# Patient Record
Sex: FEMALE | Race: WHITE | Employment: FULL TIME | URBAN - METROPOLITAN AREA
[De-identification: names, ages, dates, MRNs, and addresses within clinical notes are randomized per-mention and may not be internally consistent; named-entity substitution may affect disease eponyms.]

---

## 2018-02-20 PROBLEM — E03.9 HYPOTHYROIDISM: Status: ACTIVE | Noted: 2018-02-20

## 2019-05-28 ENCOUNTER — OFFICE VISIT (OUTPATIENT)
Dept: PRIMARY CARE CLINIC | Age: 69
End: 2019-05-28
Payer: COMMERCIAL

## 2019-05-28 VITALS
WEIGHT: 153.8 LBS | SYSTOLIC BLOOD PRESSURE: 122 MMHG | DIASTOLIC BLOOD PRESSURE: 84 MMHG | HEART RATE: 69 BPM | BODY MASS INDEX: 27.46 KG/M2 | OXYGEN SATURATION: 98 %

## 2019-05-28 DIAGNOSIS — Z12.31 ENCOUNTER FOR SCREENING MAMMOGRAM FOR BREAST CANCER: ICD-10-CM

## 2019-05-28 DIAGNOSIS — Z78.0 POST-MENOPAUSAL: ICD-10-CM

## 2019-05-28 DIAGNOSIS — Z11.59 NEED FOR HEPATITIS C SCREENING TEST: ICD-10-CM

## 2019-05-28 DIAGNOSIS — R06.2 WHEEZING: ICD-10-CM

## 2019-05-28 DIAGNOSIS — Z12.11 SCREENING FOR COLON CANCER: ICD-10-CM

## 2019-05-28 DIAGNOSIS — E03.9 HYPOTHYROIDISM, UNSPECIFIED TYPE: Primary | ICD-10-CM

## 2019-05-28 PROCEDURE — 1123F ACP DISCUSS/DSCN MKR DOCD: CPT | Performed by: FAMILY MEDICINE

## 2019-05-28 PROCEDURE — G8427 DOCREV CUR MEDS BY ELIG CLIN: HCPCS | Performed by: FAMILY MEDICINE

## 2019-05-28 PROCEDURE — 1036F TOBACCO NON-USER: CPT | Performed by: FAMILY MEDICINE

## 2019-05-28 PROCEDURE — 4040F PNEUMOC VAC/ADMIN/RCVD: CPT | Performed by: FAMILY MEDICINE

## 2019-05-28 PROCEDURE — 99213 OFFICE O/P EST LOW 20 MIN: CPT | Performed by: FAMILY MEDICINE

## 2019-05-28 PROCEDURE — G8400 PT W/DXA NO RESULTS DOC: HCPCS | Performed by: FAMILY MEDICINE

## 2019-05-28 PROCEDURE — G8419 CALC BMI OUT NRM PARAM NOF/U: HCPCS | Performed by: FAMILY MEDICINE

## 2019-05-28 PROCEDURE — 3017F COLORECTAL CA SCREEN DOC REV: CPT | Performed by: FAMILY MEDICINE

## 2019-05-28 PROCEDURE — 1090F PRES/ABSN URINE INCON ASSESS: CPT | Performed by: FAMILY MEDICINE

## 2019-05-28 RX ORDER — LEVOTHYROXINE SODIUM 0.07 MG/1
TABLET ORAL
Qty: 90 TABLET | Refills: 3 | Status: SHIPPED | OUTPATIENT
Start: 2019-05-28 | End: 2020-06-02 | Stop reason: SDUPTHER

## 2019-05-28 RX ORDER — ALBUTEROL SULFATE 90 UG/1
2 AEROSOL, METERED RESPIRATORY (INHALATION) EVERY 4 HOURS PRN
Qty: 1 INHALER | Refills: 1 | Status: SHIPPED | OUTPATIENT
Start: 2019-05-28 | End: 2022-07-29

## 2019-05-28 ASSESSMENT — ENCOUNTER SYMPTOMS
EYE ITCHING: 0
SHORTNESS OF BREATH: 0
COUGH: 1
WHEEZING: 1

## 2019-05-28 ASSESSMENT — PATIENT HEALTH QUESTIONNAIRE - PHQ9
1. LITTLE INTEREST OR PLEASURE IN DOING THINGS: 0
2. FEELING DOWN, DEPRESSED OR HOPELESS: 0
SUM OF ALL RESPONSES TO PHQ QUESTIONS 1-9: 0
SUM OF ALL RESPONSES TO PHQ9 QUESTIONS 1 & 2: 0
SUM OF ALL RESPONSES TO PHQ QUESTIONS 1-9: 0

## 2019-05-28 NOTE — PATIENT INSTRUCTIONS
Patient Education        Wheezing or Bronchoconstriction: Care Instructions  Your Care Instructions  Wheezing is a whistling noise made during breathing. It occurs when the small airways, or bronchial tubes, that lead to your lungs swell or contract (spasm) and become narrow. This narrowing is called bronchoconstriction. When your airways constrict, it is hard for air to pass through and this makes it hard for you to breathe. Wheezing and bronchoconstriction can be caused by many problems, including:  · An infection such as the flu or a cold. · Allergies such as hay fever. · Diseases such as asthma or chronic obstructive pulmonary disease. · Smoking. Treatment for your wheezing depends on what is causing the problem. Your wheezing may get better without treatment. But you may need to pay attention to things that cause your wheezing and avoid them. Or you may need medicine to help treat the wheezing and to reduce the swelling or to relieve spasms in your lungs. Follow-up care is a key part of your treatment and safety. Be sure to make and go to all appointments, and call your doctor if you are having problems. It is also a good idea to know your test results and keep a list of the medicines you take. How can you care for yourself at home? · Take your medicine exactly as prescribed. Call your doctor if you think you are having a problem with your medicine. You will get more details on the specific medicine your doctor prescribes. · If your doctor prescribed antibiotics, take them as directed. Do not stop taking them just because you feel better. You need to take the full course of antibiotics. · Breathe moist air from a humidifier, hot shower, or sink filled with hot water. This may help ease your symptoms and make it easier for you to breathe. · If you have congestion in your nose and throat, drinking plenty of fluids, especially hot fluids, may help relieve your symptoms.  If you have kidney, heart, or liver disease and have to limit fluids, talk with your doctor before you increase the amount of fluids you drink. · If you have mucus in your airways, it may help to breathe deeply and cough. · Do not smoke or allow others to smoke around you. Smoking can make your wheezing worse. If you need help quitting, talk to your doctor about stop-smoking programs and medicines. These can increase your chances of quitting for good. · Avoid things that may cause your wheezing. These may include colds, smoke, air pollution, dust, pollen, pets, cockroaches, stress, and cold air. When should you call for help? Call 911 anytime you think you may need emergency care. For example, call if:    · You have severe trouble breathing.     · You passed out (lost consciousness).    Call your doctor now or seek immediate medical care if:    · You cough up yellow, dark brown, or bloody mucus (sputum).     · You have new or worse shortness of breath.     · Your wheezing is not getting better or it gets worse after you start taking your medicine.    Watch closely for changes in your health, and be sure to contact your doctor if:    · You do not get better as expected. Where can you learn more? Go to https://Only Mall[employer]ARE [employer]KS.[facility]. org and sign in to your [employer] account. Enter [id number] [id number] in the [facility] box to learn more about \"Wheezing or Bronchoconstriction: Care Instructions. \"     If you do not have an account, please click on the \"Sign Up Now\" link. Current as of: September 5, 2018  Content Version: 12.0  © 2941-1547 Healthwise, Incorporated. Care instructions adapted under license by [facility] ([facility]). If you have questions about a medical condition or this instruction, always ask your healthcare professional. [place] [address] any warranty or liability for your use of this information.          Patient Education        Colon Cancer Screening: Care Instructions  Your Care Instructions    Colorectal cancer a tissue sample (biopsy). What tests do you need? The following guidelines are for adults who are not at high risk for colorectal cancer. You may have at least one of these tests as directed by your doctor. · Fecal immunochemical test (FIT) or guaiac fecal occult blood test (gFOBT) every year  · Sigmoidoscopy every 5 years  · Colonoscopy every 10 years  If you are over age 76, you can work with your doctor to decide if screening is a good option. Talk with your doctor about when you need to be tested. And discuss which tests are right for you. Your doctor may recommend earlier or more frequent testing if you:  · Have had colorectal cancer before. · Have had colon polyps. · Have symptoms of colorectal cancer. These include blood in your stool and changes in your bowel habits. · Have a parent, brother or sister, or child with colon polyps or colorectal cancer. · Have a bowel disease. This includes ulcerative colitis and Crohn's disease. · Have a rare polyp syndrome that runs in families, such as familial adenomatous polyposis (FAP). · Have had radiation treatments to the belly or pelvis. When should you call for help? Watch closely for changes in your health, and be sure to contact your doctor if:    · You have any changes in your bowel habits.     · You have any problems. Where can you learn more? Go to https://[employer]. org and sign in to your "[employer] account. Enter [id number] [id number] [phone] in the [facility] box to learn more about \"Colon Cancer Screening: Care Instructions. \"     If you do not have an account, please click on the \"Sign Up Now\" link. Current as of: December 19, 2018  Content Version: 12.0  © 2896-3718 Healthwise, Incorporated. Care instructions adapted under license by [facility] ([facility]).  If you have questions about a medical condition or this instruction, always ask your healthcare professional. [address] any warranty or liability for your use of this information. Patient Education        tetanus, diphtheria, acellular pertussis vaccine (Tdap)  Pronunciation:  TET a nus, dif THEER ee a, and ay POLI gordono lyn per TUS iss  Brand:  Adacel (Tdap), Boostrix (Tdap)  What is the most important information I should know about this vaccine? You should not receive this vaccine if you have ever had had a life-threatening allergic reaction to a tetanus, diphtheria, or pertussis vaccine. You also should not receive this vaccine if you had a neurologic disorder affecting your brain within 7 days after having a previous pertussis vaccine. What is tetanus, diphtheria, acellular pertussis vaccine (Tdap)? Tetanus, diphtheria, and pertussis are serious diseases caused by bacteria. Tetanus (lockjaw) causes painful tightening of the muscles, usually all over the body. It can lead to \"locking\" of the jaw so the victim cannot open the mouth or swallow. Tetanus leads to death in about 1 out of 10 cases. Diphtheria causes a thick coating in the nose, throat, and airways. It can lead to breathing problems, paralysis, heart failure, or death. Pertussis (whooping cough) causes coughing so severe that it interferes with eating, drinking, or breathing. These spells can last for weeks and can lead to pneumonia, seizures (convulsions), brain damage, and death. Diphtheria and pertussis are spread from person to person. Tetanus enters the body through a cut or wound. The diphtheria, tetanus acellular, and pertussis adult vaccine (also called Tdap) is used to help prevent these diseases in people who are at least 8years old. Most people in this age group require only one Tdap shot for protection against these diseases. Tdap vaccine is especially important for healthcare workers or people who have close contact with a baby younger than 13 months old.   This vaccine works by exposing you to a small dose of the bacteria or a protein from the bacteria, which causes the body to develop immunity to the disease. This vaccine will not treat an active infection that has already developed in the body. Like any vaccine, the Tdap vaccine may not provide protection from disease in every person. What should I discuss with my healthcare provider before receiving this vaccine? You should not receive this vaccine if:  · you had a life-threatening allergic reaction to any vaccine that contains tetanus, diphtheria, or pertussis; or  · you had a neurologic disorder affecting your brain (such as loss of consciousness or a prolonged seizure) within 7 days after having a previous pertussis vaccine. You may not be able to receive a Tdap vaccine if you have ever received a similar vaccine that caused any of the following:  · a very high fever (over 104 degrees Fahrenheit);  · a neurologic disorder or disease affecting the brain;  · fainting or going into shock;  · severe pain, redness, tenderness, swelling, or a lump where the shot was given;  · an allergy to latex rubber;  · severe or uncontrolled epilepsy or other seizure disorder; or  · Guillain-Barré syndrome (within 6 weeks after receiving a vaccine containing tetanus). If you have any of these other conditions, your vaccine may need to be postponed or not given at all:  · a history of seizures;  · a weak immune system caused by disease, bone marrow transplant, or by using certain medicines or receiving cancer treatments; or  · if it has been less than 10 years since you last received a tetanus shot. You can still receive a vaccine if you have a minor cold. In the case of a more severe illness with a fever or any type of infection, wait until you get better before receiving this vaccine. It is not known whether Tdap vaccine will harm an unborn baby. However, you may need a Tdap vaccine during pregnancy to protect your  baby from pertussis. [place] babies are most at risk for severe, life-threatening complications from pertussis.  Your doctor should determine whether you need this vaccine during pregnancy. If you are pregnant, your name may be listed on a pregnancy registry. This is to track the outcome of the pregnancy and to evaluate any effects of the Tdap vaccine on the baby. It is not known whether Tdap vaccine passes into breast milk or if it could harm a nursing baby. Tell your doctor if you are breast-feeding a baby. The adult version of this vaccine (Adacel, Boostrix) should not be given to anyone under the age of 8. Another vaccine is available for use in children younger than 8years old. How is this vaccine given? This vaccine is given as an injection (shot) into a muscle. You will receive this injection in a doctor's office or clinic setting. Tdap vaccine is usually given as a one-time injection. Unless your doctor's tells you otherwise, you will not need a booster vaccine. Tdap vaccine is usually given once every 10 years. What happens if I miss a dose? Since the Tdap vaccine is usually given only once, you are not likely to miss a dose. What happens if I overdose? An overdose of this vaccine is unlikely to occur. What should I avoid before or after receiving this vaccine? Follow your doctor's instructions about any restrictions on food, beverages, or activity after receiving a Tdap vaccine. What are the possible side effects of this vaccine? Keep track of any and all side effects you have after receiving this vaccine. If you ever need to receive a booster dose, you will need to tell your doctor if the previous shot caused any side effects. You should not receive a booster vaccine if you had a life threatening allergic reaction after the first shot. Becoming infected with diphtheria, pertussis, or tetanus is much more dangerous to your health than receiving this vaccine. However, like any medicine, this vaccine can cause side effects but the risk of serious side effects is extremely low.   Get emergency medical help if you have signs of an allergic reaction: hives; difficult breathing; swelling of your face, lips, tongue, or throat. Call your doctor at once if you have any of these side effects within 7 days after receiving Tdap vaccine:  · numbness, weakness, or tingling in your feet and legs;  · problems with walking or coordination;  · sudden pain in your arms or shoulders;  · a light-headed feeling, like you might pass out;  · vision problems, ringing in your ears;  · seizure (black-out or convulsions); or  · redness, swelling, bleeding, or severe pain where the shot was given. Common side effects may include:  · mild pain or tenderness where the shot was given;  · headache or tiredness;  · body aches; or  · mild nausea, diarrhea, or vomiting. This is not a complete list of side effects and others may occur. Call your doctor for medical advice about side effects. You may report vaccine side effects to the [place] [address] and Human Services at [phone]. What other drugs will affect tetanus, diphtheria, acellular pertussis vaccine? Before receiving this vaccine, tell your doctor about all other vaccines you have recently received. Also tell the doctor if you have recently received drugs or treatments that can weaken the immune system, including:  · an oral, nasal, inhaled, or injectable steroid medicine;  · medications to treat psoriasis, rheumatoid arthritis, or other autoimmune disorders; or  · medicines to treat or prevent organ transplant rejection. If you are using any of these medications, you may not be able to receive the vaccine, or may need to wait until the other treatments are finished. This list is not complete. Other drugs may interact with this vaccine, including prescription and over-the-counter medicines, vitamins, and herbal products. Not all possible interactions are listed in this medication guide. Where can I get more information?   Your doctor or pharmacist can provide more information about this vaccine. Additional information is available from your local health department or the Centers for Disease Control and Prevention. Remember, keep this and all other medicines out of the reach of children, never share your medicines with others, and use this medication only for the indication prescribed. Every effort has been made to ensure that the information provided by [address] is accurate, up-to-date, and complete, but no guarantee is made to that effect. Drug information contained herein may be time sensitive. [facility] information has been compiled for use by healthcare practitioners and consumers in the [place] and therefore [facility] does not warrant that uses outside of the [place] are appropriate, unless specifically indicated otherwise. [facility]'s drug information does not endorse drugs, diagnose patients or recommend therapy. [facility]'s drug information is an informational resource designed to assist licensed healthcare practitioners in caring for their patients and/or to serve consumers viewing this service as a supplement to, and not a substitute for, the expertise, skill, knowledge and judgment of healthcare practitioners. The absence of a warning for a given drug or drug combination in no way should be construed to indicate that the drug or drug combination is safe, effective or appropriate for any given patient. [facility] does not assume any responsibility for any aspect of healthcare administered with the aid of information [facility] provides. The information contained herein is not intended to cover all possible uses, directions, precautions, warnings, drug interactions, allergic reactions, or adverse effects. If you have questions about the drugs you are taking, check with your doctor, nurse or pharmacist.  Copyright 8019-4851 [address]. Version: 3.01. Revision date: 8/19/2016. Care instructions adapted under license by [facility] ([facility]).  If you have questions about a medical condition or this instruction, always ask your healthcare professional. [place] [address] any warranty or liability for your use of this information.

## 2019-05-28 NOTE — PROGRESS NOTES
Corwin Yeboah a 77 y.o. female who presents today for her medical conditions/complaints as notedbelow. Chief Complaint   Patient presents with    Medication Check    Wheezing         HPI:   HPI  Thyroid med: has not had labs recently. Energy level ok  No hair or nail issues. Wheezing off and on x 12 + months. Worse since the spring, was better in the winter  Last fall : she's not sure. Not using any meds for now. Used up her inhlaer. Inhaler did help. LDL Calculated (mg/dL)   Date Value   02/15/2018 148       (goal LDL is <100)   No results found for: AST, ALT, BUN  BP Readings from Last 3 Encounters:   05/28/19 122/84   02/20/18 126/82   02/13/18 130/86          (goal 120/80)    Past Medical History:   Diagnosis Date    Thyroid disease       Past Surgical History:   Procedure Laterality Date    DILATION AND CURETTAGE OF UTERUS  1980's    TONSILLECTOMY      TUBAL LIGATION         Family History   Problem Relation Age of Onset    Alzheimer's Disease Father     Alzheimer's Disease Maternal Grandmother     Heart Disease Paternal Grandfather        Social History     Tobacco Use    Smoking status: Never Smoker    Smokeless tobacco: Never Used   Substance Use Topics    Alcohol use: Yes     Comment: rarely      Current Outpatient Medications   Medication Sig Dispense Refill    albuterol sulfate HFA (PROAIR HFA) 108 (90 Base) MCG/ACT inhaler Inhale 2 puffs into the lungs every 4 hours as needed for Wheezing 1 Inhaler 1    levothyroxine (SYNTHROID) 75 MCG tablet One tab daily Mon- Saturday and 1/2 tab on Sunday 90 tablet 3    Multiple Vitamins-Minerals (THERAPEUTIC MULTIVITAMIN-MINERALS) tablet Take 1 tablet by mouth daily      loratadine (CLARITIN) 10 MG tablet Take 10 mg by mouth as needed       No current facility-administered medications for this visit.       Allergies   Allergen Reactions    Sulfa Antibiotics Rash       Health Maintenance   Topic Date Due    Hepatitis C screen 1950    DTaP/Tdap/Td vaccine (1 - Tdap) 03/10/1970    Breast cancer screen  03/10/2001    Shingles Vaccine (1 of 2) 03/10/2001    Colon cancer screen colonoscopy  03/10/2001    DEXA (modify frequency per FRAX score)  03/10/2016    Pneumococcal 65+ years Vaccine (1 of 2 - PCV13) 03/10/2016    TSH testing  02/15/2019    Flu vaccine (Season Ended) 09/01/2019    Lipid screen  02/15/2023       Subjective:      Review of Systems   Constitutional: Negative. HENT: Negative. Eyes: Negative for itching. Respiratory: Positive for cough (rarely) and wheezing. Negative for shortness of breath. Objective:     /84   Pulse 69   Wt 153 lb 12.8 oz (69.8 kg)   SpO2 98%   BMI 27.46 kg/m²   Physical Exam   Constitutional: She is oriented to person, place, and time. She appears well-developed and well-nourished. No distress. HENT:   Head: Normocephalic and atraumatic. Right Ear: External ear normal.   Left Ear: External ear normal.   Mouth/Throat: Oropharynx is clear and moist.   Eyes: Pupils are equal, round, and reactive to light. Conjunctivae are normal. Right eye exhibits no discharge. Left eye exhibits no discharge. No scleral icterus. Neck: No tracheal deviation present. No thyromegaly present. Cardiovascular: Normal rate, regular rhythm and normal heart sounds. No carotid bruits   Pulmonary/Chest: Effort normal and breath sounds normal. No respiratory distress. She has no wheezes. Musculoskeletal: She exhibits no edema. Lymphadenopathy:     She has no cervical adenopathy. Neurological: She is alert and oriented to person, place, and time. Skin: Skin is warm. No rash noted. Psychiatric: She has a normal mood and affect. Her behavior is normal. Thought content normal.   Nursing note and vitals reviewed. Assessment:       Diagnosis Orders   1. Hypothyroidism, unspecified type  TSH 3RD GENERATION    T4, Free    levothyroxine (SYNTHROID) 75 MCG tablet   2.  Wheezing  Full PFT Study With Bronchodilator    XR CHEST STANDARD (2 VW)   3. Post-menopausal  DEXA Bone Density Axial Skeleton   4. Encounter for screening mammogram for breast cancer  MEET Digital Screen Bilateral [[id number]]   5. Screening for colon cancer  POCT FECAL IMMUNOCHEMICAL TEST (FIT)   6. Need for hepatitis C screening test  Hepatitis C Antibody        Plan:      No follow-ups on file. Orders Placed This Encounter   Procedures    XR CHEST STANDARD (2 VW)     Standing Status:   Future     Standing Expiration Date:   5/27/2020     Order Specific Question:   Reason for exam:     Answer:   recurrent wheezing    MEET Digital Screen Bilateral [[id number]]     Standing Status:   Future     Standing Expiration Date:   5/27/2020    DEXA Bone Density Axial Skeleton     Standing Status:   Future     Standing Expiration Date:   5/28/2020    TSH 3RD GENERATION     Standing Status:   Future     Standing Expiration Date:   5/27/2020    T4, Free     Standing Status:   Future     Standing Expiration Date:   5/27/2020    Hepatitis C Antibody     Standing Status:   Future     Standing Expiration Date:   5/28/2020    POCT FECAL IMMUNOCHEMICAL TEST (FIT)     Standing Status:   Future     Standing Expiration Date:   5/28/2020    Full PFT Study With Bronchodilator     Standing Status:   Future     Standing Expiration Date:   5/27/2020     Scheduling Instructions: This is to be done at the hospital     Orders Placed This Encounter   Medications    albuterol sulfate HFA (PROAIR HFA) 108 (90 Base) MCG/ACT inhaler     Sig: Inhale 2 puffs into the lungs every 4 hours as needed for Wheezing     Dispense:  1 Inhaler     Refill:  1    levothyroxine (SYNTHROID) 75 MCG tablet     Sig: One tab daily Mon- Saturday and 1/2 tab on Sunday     Dispense:  90 tablet     Refill:  3       Patient given educational materials - see patient instructions. Discussed use, benefit, and side effects of prescribed medications.   All patient questions

## 2019-12-11 ENCOUNTER — TELEPHONE (OUTPATIENT)
Dept: PRIMARY CARE CLINIC | Age: 69
End: 2019-12-11

## 2019-12-11 ENCOUNTER — TELEPHONE (OUTPATIENT)
Dept: WOMENS IMAGING | Age: 69
End: 2019-12-11

## 2020-06-02 ENCOUNTER — OFFICE VISIT (OUTPATIENT)
Dept: PRIMARY CARE CLINIC | Age: 70
End: 2020-06-02
Payer: COMMERCIAL

## 2020-06-02 VITALS
SYSTOLIC BLOOD PRESSURE: 118 MMHG | WEIGHT: 151.8 LBS | DIASTOLIC BLOOD PRESSURE: 72 MMHG | OXYGEN SATURATION: 96 % | HEART RATE: 83 BPM | BODY MASS INDEX: 26.9 KG/M2 | TEMPERATURE: 98.1 F | RESPIRATION RATE: 16 BRPM | HEIGHT: 63 IN

## 2020-06-02 PROCEDURE — 1090F PRES/ABSN URINE INCON ASSESS: CPT | Performed by: FAMILY MEDICINE

## 2020-06-02 PROCEDURE — 1036F TOBACCO NON-USER: CPT | Performed by: FAMILY MEDICINE

## 2020-06-02 PROCEDURE — G8427 DOCREV CUR MEDS BY ELIG CLIN: HCPCS | Performed by: FAMILY MEDICINE

## 2020-06-02 PROCEDURE — 4040F PNEUMOC VAC/ADMIN/RCVD: CPT | Performed by: FAMILY MEDICINE

## 2020-06-02 PROCEDURE — 1123F ACP DISCUSS/DSCN MKR DOCD: CPT | Performed by: FAMILY MEDICINE

## 2020-06-02 PROCEDURE — 3017F COLORECTAL CA SCREEN DOC REV: CPT | Performed by: FAMILY MEDICINE

## 2020-06-02 PROCEDURE — G8400 PT W/DXA NO RESULTS DOC: HCPCS | Performed by: FAMILY MEDICINE

## 2020-06-02 PROCEDURE — G8419 CALC BMI OUT NRM PARAM NOF/U: HCPCS | Performed by: FAMILY MEDICINE

## 2020-06-02 PROCEDURE — 99213 OFFICE O/P EST LOW 20 MIN: CPT | Performed by: FAMILY MEDICINE

## 2020-06-02 RX ORDER — LEVOTHYROXINE SODIUM 0.07 MG/1
TABLET ORAL
Qty: 90 TABLET | Refills: 3 | Status: SHIPPED | OUTPATIENT
Start: 2020-06-02 | End: 2021-07-09 | Stop reason: SDUPTHER

## 2020-06-02 RX ORDER — LEVOCETIRIZINE DIHYDROCHLORIDE 5 MG/1
5 TABLET, FILM COATED ORAL NIGHTLY
COMMUNITY

## 2020-06-02 ASSESSMENT — PATIENT HEALTH QUESTIONNAIRE - PHQ9
SUM OF ALL RESPONSES TO PHQ QUESTIONS 1-9: 0
SUM OF ALL RESPONSES TO PHQ9 QUESTIONS 1 & 2: 0
1. LITTLE INTEREST OR PLEASURE IN DOING THINGS: 0
2. FEELING DOWN, DEPRESSED OR HOPELESS: 0
SUM OF ALL RESPONSES TO PHQ QUESTIONS 1-9: 0

## 2020-06-02 ASSESSMENT — ENCOUNTER SYMPTOMS: SHORTNESS OF BREATH: 1

## 2020-06-02 NOTE — PATIENT INSTRUCTIONS
reaction, other serious injury, or death. What if there is a serious problem? An allergic reaction could occur after the vaccinated person leaves the clinic. If you see signs of a severe allergic reaction (hives, swelling of the face and throat, difficulty breathing, a fast heartbeat, dizziness, or weakness), call 9-1-1 and get the person to the nearest hospital.  For other signs that concern you, call your health care provider. Adverse reactions should be reported to the Vaccine Adverse Event Reporting System (VAERS). Your health care provider will usually file this report, or you can do it yourself. Visit the VAERS website at www.vaers. hhs.gov at www.vaers. hhs.gov or call 2-823.812.3132. VAERS is only for reporting reactions, and VAERS staff do not give medical advice. How can I learn more? · Ask your health care provider. · Call your local or state health department. · Contact the Centers for Disease Control and Prevention (CDC):  ? Call 0-854.343.8500 (1-800-CDC-INFO) or  ? Visit CDC's website at www.cdc.gov/vaccines  Vaccine Information Statement  PPSV23 Vaccine  10/30/2019  [facility] and [facility] for Disease Control and Prevention  Many Vaccine Information Statements are available in Chinese and other languages. See www.immunize.org/vis. Hojas de información Sobre Vacunas están disponibles en español y en muchos otros idiomas. Visite Paul.si. Care instructions adapted under license by [facility] ([facility]). If you have questions about a medical condition or this instruction, always ask your healthcare professional. [place] [address] any warranty or liability for your use of this information.          Patient Education        tetanus, diphtheria, acellular pertussis vaccine (Tdap)  Pronunciation:  TET a nus, dif THEER ee a, and ay POLI elizabeth ler per TUS iss  Brand:  Adacel (Tdap), Boostrix (Tdap)  What is the most important information I should know receiving this vaccine? You should not receive this vaccine if:  · you had a life-threatening allergic reaction to any vaccine that contains tetanus, diphtheria, or pertussis; or  · you had a neurologic disorder affecting your brain (such as loss of consciousness or a prolonged seizure) within 7 days after having a previous pertussis vaccine. You may not be able to receive a Tdap vaccine if you have ever received a similar vaccine that caused any of the following:  · a very high fever (over 104 degrees Fahrenheit);  · a neurologic disorder or disease affecting the brain;  · fainting or going into shock;  · severe pain, redness, tenderness, swelling, or a lump where the shot was given;  · an allergy to latex rubber;  · severe or uncontrolled epilepsy or other seizure disorder; or  · Guillain-Barré syndrome (within 6 weeks after receiving a vaccine containing tetanus). If you have any of these other conditions, your vaccine may need to be postponed or not given at all:  · a history of seizures;  · a weak immune system caused by disease, bone marrow transplant, or by using certain medicines or receiving cancer treatments; or  · if it has been less than 10 years since you last received a tetanus shot. You can still receive a vaccine if you have a minor cold. In the case of a more severe illness with a fever or any type of infection, wait until you get better before receiving this vaccine. It is not known whether Tdap vaccine will harm an unborn baby. However, you may need a Tdap vaccine during pregnancy to protect your  baby from pertussis. Yahaira Edwards babies are most at risk for severe, life-threatening complications from pertussis. Your doctor should determine whether you need this vaccine during pregnancy. If you are pregnant, your name may be listed on a pregnancy registry. This is to track the outcome of the pregnancy and to evaluate any effects of the Tdap vaccine on the baby.   It is not known whether Tdap vaccine passes into breast milk or if it could harm a nursing baby. Tell your doctor if you are breast-feeding a baby. The adult version of this vaccine (Adacel, Boostrix) should not be given to anyone under the age of 8. Another vaccine is available for use in children younger than 8years old. How is this vaccine given? This vaccine is given as an injection (shot) into a muscle. You will receive this injection in a doctor's office or clinic setting. Tdap vaccine is usually given as a one-time injection. Unless your doctor's tells you otherwise, you will not need a booster vaccine. Tdap vaccine is usually given once every 10 years. What happens if I miss a dose? Since the Tdap vaccine is usually given only once, you are not likely to miss a dose. What happens if I overdose? An overdose of this vaccine is unlikely to occur. What should I avoid before or after receiving this vaccine? Follow your doctor's instructions about any restrictions on food, beverages, or activity after receiving a Tdap vaccine. What are the possible side effects of this vaccine? Keep track of any and all side effects you have after receiving this vaccine. If you ever need to receive a booster dose, you will need to tell your doctor if the previous shot caused any side effects. You should not receive a booster vaccine if you had a life threatening allergic reaction after the first shot. Becoming infected with diphtheria, pertussis, or tetanus is much more dangerous to your health than receiving this vaccine. However, like any medicine, this vaccine can cause side effects but the risk of serious side effects is extremely low. Get emergency medical help if you have signs of an allergic reaction: hives; difficult breathing; swelling of your face, lips, tongue, or throat.   Call your doctor at once if you have any of these side effects within 7 days after receiving Tdap vaccine:  · numbness, weakness, or tingling in your feet and Know  Why get vaccinated? Recombinant zoster (shingles) vaccine can prevent shingles. Shingles (also called herpes zoster, or just zoster) is a painful skin rash, usually with blisters. In addition to the rash, shingles can cause fever, headache, chills, or upset stomach. More rarely, shingles can lead to pneumonia, hearing problems, blindness, brain inflammation (encephalitis), or death. The most common complication of shingles is long-term nerve pain called postherpetic neuralgia (PHN). PHN occurs in the areas where the shingles rash was, even after the rash clears up. It can last for months or years after the rash goes away. The pain from PHN can be severe and debilitating. About 10 to 18% of people who get shingles will experience PHN. The risk of PHN increases with age. An older adult with shingles is more likely to develop PHN and have longer lasting and more severe pain than a younger person with shingles. Shingles is caused by the varicella zoster virus, the same virus that causes chickenpox. After you have chickenpox, the virus stays in your body and can cause shingles later in life. Shingles cannot be passed from one person to another, but the virus that causes shingles can spread and cause chickenpox in someone who had never had chickenpox or received chickenpox vaccine. Recombinant shingles vaccine  Recombinant shingles vaccine provides strong protection against shingles. By preventing shingles, recombinant shingles vaccine also protects against PHN. Recombinant shingles vaccine is the preferred vaccine for the prevention of shingles. However, a different vaccine, live shingles vaccine, may be used in some circumstances. The recombinant shingles vaccine is recommended for adults 50 years and older without serious immune problems. It is given as a two-dose series. This vaccine is also recommended for people who have already gotten another type of shingles vaccine, the live shingles vaccine.  There is no live virus in this vaccine. Shingles vaccine may be given at the same time as other vaccines. Talk with your health care provider  Tell your vaccine provider if the person getting the vaccine:  · Has had an allergic reaction after a previous dose of recombinant shingles vaccine, or has any severe, life-threatening allergies. · Is pregnant or breastfeeding. · Is currently experiencing an episode of shingles. In some cases, your health care provider may decide to postpone shingles vaccination to a future visit. People with minor illnesses, such as a cold, may be vaccinated. People who are moderately or severely ill should usually wait until they recover before getting recombinant shingles vaccine. Your health care provider can give you more information. Risks of a vaccine reaction  · A sore arm with mild or moderate pain is very common after recombinant shingles vaccine, affecting about 80% of vaccinated people. Redness and swelling can also happen at the site of the injection. · Tiredness, muscle pain, headache, shivering, fever, stomach pain, and nausea happen after vaccination in more than half of people who receive recombinant shingles vaccine. In clinical trials, about 1 out of 6 people who got recombinant zoster vaccine experienced side effects that prevented them from doing regular activities. Symptoms usually went away on their own in 2 to 3 days. You should still get the second dose of recombinant zoster vaccine even if you had one of these reactions after the first dose. People sometimes faint after medical procedures, including vaccination. Tell your provider if you feel dizzy or have vision changes or ringing in the ears. As with any medicine, there is a very remote chance of a vaccine causing a severe allergic reaction, other serious injury, or death. What if there is a serious problem? An allergic reaction could occur after the vaccinated person leaves the clinic.  If you see signs of

## 2020-06-11 ENCOUNTER — HOSPITAL ENCOUNTER (OUTPATIENT)
Age: 70
Setting detail: SPECIMEN
Discharge: HOME | End: 2020-06-11
Payer: COMMERCIAL

## 2020-06-11 LAB
THYROXINE, FREE: 1.35 NG/DL (ref 0.93–1.7)
TSH SERPL DL<=0.05 MIU/L-ACNC: 0.77 MIU/L (ref 0.3–5)

## 2021-06-01 DIAGNOSIS — Z12.11 COLON CANCER SCREENING: ICD-10-CM

## 2021-07-08 DIAGNOSIS — E03.9 HYPOTHYROIDISM, UNSPECIFIED TYPE: ICD-10-CM

## 2021-07-08 NOTE — TELEPHONE ENCOUNTER
----- Message from Claudia Anushadonna sent at 7/8/2021  4:02 PM EDT -----  Subject: Refill Request    QUESTIONS  Name of Medication? levothyroxine (SYNTHROID) 75 MCG tablet  Patient-reported dosage and instructions? pt takes one 75 mcg   levothyroxine tablet of the morning  How many days do you have left? 3  Preferred Pharmacy? [address]  Pharmacy phone number (if available)? [phone]  ---------------------------------------------------------------------------  --------------  CALL BACK INFO  What is the best way for the office to contact you? OK to leave message on   voicemail  Preferred Call Back Phone Number?  [phone]

## 2021-07-09 RX ORDER — LEVOTHYROXINE SODIUM 0.07 MG/1
TABLET ORAL
Qty: 90 TABLET | Refills: 3 | Status: SHIPPED | OUTPATIENT
Start: 2021-07-09 | End: 2022-07-29 | Stop reason: SDUPTHER

## 2021-07-29 ENCOUNTER — OFFICE VISIT (OUTPATIENT)
Dept: PRIMARY CARE CLINIC | Age: 71
End: 2021-07-29
Payer: COMMERCIAL

## 2021-07-29 VITALS
WEIGHT: 153.6 LBS | SYSTOLIC BLOOD PRESSURE: 138 MMHG | HEART RATE: 75 BPM | OXYGEN SATURATION: 98 % | BODY MASS INDEX: 27.21 KG/M2 | HEIGHT: 63 IN | DIASTOLIC BLOOD PRESSURE: 74 MMHG

## 2021-07-29 DIAGNOSIS — Z78.0 MENOPAUSE: ICD-10-CM

## 2021-07-29 DIAGNOSIS — Z13.820 OSTEOPOROSIS SCREENING: ICD-10-CM

## 2021-07-29 DIAGNOSIS — E03.9 HYPOTHYROIDISM, UNSPECIFIED TYPE: Primary | ICD-10-CM

## 2021-07-29 PROCEDURE — G8400 PT W/DXA NO RESULTS DOC: HCPCS | Performed by: FAMILY MEDICINE

## 2021-07-29 PROCEDURE — 1123F ACP DISCUSS/DSCN MKR DOCD: CPT | Performed by: FAMILY MEDICINE

## 2021-07-29 PROCEDURE — 1090F PRES/ABSN URINE INCON ASSESS: CPT | Performed by: FAMILY MEDICINE

## 2021-07-29 PROCEDURE — 99213 OFFICE O/P EST LOW 20 MIN: CPT | Performed by: FAMILY MEDICINE

## 2021-07-29 PROCEDURE — 4040F PNEUMOC VAC/ADMIN/RCVD: CPT | Performed by: FAMILY MEDICINE

## 2021-07-29 PROCEDURE — G8419 CALC BMI OUT NRM PARAM NOF/U: HCPCS | Performed by: FAMILY MEDICINE

## 2021-07-29 PROCEDURE — G8427 DOCREV CUR MEDS BY ELIG CLIN: HCPCS | Performed by: FAMILY MEDICINE

## 2021-07-29 PROCEDURE — 3017F COLORECTAL CA SCREEN DOC REV: CPT | Performed by: FAMILY MEDICINE

## 2021-07-29 PROCEDURE — 1036F TOBACCO NON-USER: CPT | Performed by: FAMILY MEDICINE

## 2021-07-29 SDOH — ECONOMIC STABILITY: FOOD INSECURITY: WITHIN THE PAST 12 MONTHS, THE FOOD YOU BOUGHT JUST DIDN'T LAST AND YOU DIDN'T HAVE MONEY TO GET MORE.: NEVER TRUE

## 2021-07-29 SDOH — ECONOMIC STABILITY: FOOD INSECURITY: WITHIN THE PAST 12 MONTHS, YOU WORRIED THAT YOUR FOOD WOULD RUN OUT BEFORE YOU GOT MONEY TO BUY MORE.: NEVER TRUE

## 2021-07-29 ASSESSMENT — PATIENT HEALTH QUESTIONNAIRE - PHQ9
SUM OF ALL RESPONSES TO PHQ QUESTIONS 1-9: 0
1. LITTLE INTEREST OR PLEASURE IN DOING THINGS: 0
SUM OF ALL RESPONSES TO PHQ QUESTIONS 1-9: 0
SUM OF ALL RESPONSES TO PHQ QUESTIONS 1-9: 0
2. FEELING DOWN, DEPRESSED OR HOPELESS: 0
SUM OF ALL RESPONSES TO PHQ9 QUESTIONS 1 & 2: 0

## 2021-07-29 ASSESSMENT — SOCIAL DETERMINANTS OF HEALTH (SDOH): HOW HARD IS IT FOR YOU TO PAY FOR THE VERY BASICS LIKE FOOD, HOUSING, MEDICAL CARE, AND HEATING?: NOT HARD AT ALL

## 2021-07-29 ASSESSMENT — ENCOUNTER SYMPTOMS: RESPIRATORY NEGATIVE: 1

## 2021-07-29 NOTE — PROGRESS NOTES
[facility]  [address] [place] [address] [address]  [address] [address]  Dept: Michelle Dillard is a 80 y.o. female Established patient, who presents today for her medical conditions/complaintsas noted below. Chief Complaint   Patient presents with    Medication Check     follow up with thyroid medication       HPI:     HPI  Energy level good  Still working   no SOB  No chest pain. Non smoker. Reviewed prior notes None  Reviewed previous Labs    LDL Calculated (mg/dL)   Date Value   02/15/2018 148       (goal LDL is <100)   TSH (mIU/L)   Date Value   06/11/2020 0.77     BP Readings from Last 3 Encounters:   07/29/21 138/74   06/02/20 118/72   05/28/19 122/84          (goal 120/80)    Past Medical History:   Diagnosis Date    Thyroid disease       Past Surgical History:   Procedure Laterality Date    DILATION AND CURETTAGE OF UTERUS  1980's    TONSILLECTOMY      TUBAL LIGATION         Family History   Problem Relation Age of Onset    Alzheimer's Disease Father     Alzheimer's Disease Maternal Grandmother     Heart Disease Paternal Grandfather        Social History     Tobacco Use    Smoking status: Never Smoker    Smokeless tobacco: Never Used   Substance Use Topics    Alcohol use: Yes     Comment: rarely      Current Outpatient Medications   Medication Sig Dispense Refill    levothyroxine (SYNTHROID) 75 MCG tablet One tab daily Mon- Saturday and 1/2 tab on Sunday 90 tablet 3    levocetirizine (XYZAL) 5 MG tablet Take 5 mg by mouth nightly      Multiple Vitamins-Minerals (THERAPEUTIC MULTIVITAMIN-MINERALS) tablet Take 1 tablet by mouth daily      albuterol sulfate HFA (PROAIR HFA) 108 (90 Base) MCG/ACT inhaler Inhale 2 puffs into the lungs every 4 hours as needed for Wheezing (Patient not taking: Reported on 7/29/2021) 1 Inhaler 1     No current facility-administered medications for this visit.      Allergies   Allergen Reactions    Eggs Or Egg-Derived Products Other (See Comments)     Skin allergy as a child  Can eat eggs occasionally     Sulfa Antibiotics Rash       Health Maintenance   Topic Date Due    Hepatitis C screen  Never done    DTaP/Tdap/Td vaccine (1 - Tdap) Never done    Breast cancer screen  Never done    Shingles Vaccine (1 of 2) Never done    DEXA (modify frequency per FRAX score)  Never done    Pneumococcal 65+ years Vaccine (1 of 1 - PPSV23) Never done    Diabetes screen  02/15/2021    TSH testing  06/11/2021    Flu vaccine (1) 09/01/2021    Lipid screen  02/15/2023    Colon cancer screen fecal DNA test (Cologuard)  05/21/2024    COVID-19 Vaccine  Completed    Hepatitis A vaccine  Aged Out    Hepatitis B vaccine  Aged Out    Hib vaccine  Aged Out    Meningococcal (ACWY) vaccine  Aged Out       Subjective:      Review of Systems   Respiratory: Negative. Cardiovascular: Negative. Neurological: Negative for dizziness and light-headedness. Objective:     /74   Pulse 75   Ht 5' 3\" (1.6 m)   Wt 153 lb 9.6 oz (69.7 kg)   SpO2 98%   BMI 27.21 kg/m²   Physical Exam  Vitals and nursing note reviewed. Constitutional:       General: She is not in acute distress. Appearance: She is well-developed. She is not ill-appearing. HENT:      Head: Normocephalic and atraumatic. Right Ear: Tympanic membrane, ear canal and external ear normal.      Left Ear: Tympanic membrane, ear canal and external ear normal.      Ears:      Comments: Partial cerumen impaction       Mouth/Throat:      Mouth: Mucous membranes are moist.      Pharynx: No oropharyngeal exudate. Eyes:      General: No scleral icterus. Right eye: No discharge. Left eye: No discharge. Conjunctiva/sclera: Conjunctivae normal.      Pupils: Pupils are equal, round, and reactive to light. Neck:      Thyroid: No thyromegaly. Trachea: No tracheal deviation.    Cardiovascular:      Rate and Rhythm: Normal rate and regular

## 2021-07-29 NOTE — PATIENT INSTRUCTIONS
Patient Education        Learning About Healthy Eating During Menopause  Why is healthy eating important during menopause? Healthy eating is an important part of caring for yourself during menopause. It gives your body nutrients you need, like calcium and vitamin D. It helps you stay at a healthy weight. And it can reduce your risk for health problems that are more common after menopause, such as heart disease and osteoporosis. Eating healthy during menopause  Here are some things you can do to eat healthy during menopause. · Eat a heart-healthy diet. Choose foods like vegetables, fruits, nuts, beans, fish, and whole grains. Limit foods that have a lot of salt, fat, and sugar. · Choose foods that have a lot of calcium. These include milk, cheese, yogurt, and calcium-fortified orange juice, soy milk, and tofu. Other sources of calcium include canned sardines, canned salmon with bones, and leafy green vegetables such as broccoli, kale, and Chinese cabbage. Between the ages of 23 and 48, you need 1,000 milligrams (mg) of calcium a day. At 46 and older, you need 1,200 mg a day. · Eat foods that are good sources of vitamin D.   Vitamin D helps your body use calcium. Foods that have vitamin D include salmon, tuna, and mackerel. Vitamin D-fortified foods like milk, soy milk, orange juice, and cereal are also good sources. Between the ages of 23 and 79, you need 600 international units (IU) of vitamin D a day. At age 70 and older, you need 800 IU a day. · Talk to your doctor about taking a calcium and vitamin D supplement. It may be a good idea for you if you don't get enough of these nutrients from the foods you eat. · Limit caffeine. Caffeine can cause sleep problems. It can also make you feel anxious. If you are bothered by symptoms like these, pay attention to how much caffeine you are getting. Caffeine is found in coffee, tea, energy drinks, and foods and drinks that contain chocolate.   · Limit your intake of alcohol. Drinking may make menopause symptoms worse. Follow-up care is a key part of your treatment and safety. Be sure to make and go to all appointments, and call your doctor if you are having problems. It's also a good idea to know your test results and keep a list of the medicines you take. Where can you learn more? Go to https://chpepiceweb.Zentila. org and sign in to your [facility] account. Enter D640 in the [employer] box to learn more about \"Learning About Healthy Eating During Menopause. \"     If you do not have an account, please click on the \"Sign Up Now\" link. Current as of: December 17, 2020               Content Version: 12.9  © 2006-2021 [employer]. Care instructions adapted under license by [facility] ([facility]). If you have questions about a medical condition or this instruction, always ask your healthcare professional. [place] [address] any warranty or liability for your use of this information. Patient Education        Osteoporosis: Care Instructions  Overview     Osteoporosis causes bones to become thin and weak. It is much more common in women than in men. Your chances of getting this disease depend on several things. These factors include the thickness of your bones (bone density), as well as health, diet, and physical activity. This disease may be very advanced before you know you have it. Sometimes the first sign is a broken bone in the hip, spine, or wrist. Or you may have sudden pain in your middle or lower back. Follow-up care is a key part of your treatment and safety. Be sure to make and go to all appointments, and call your doctor if you are having problems. It's also a good idea to know your test results and keep a list of the medicines you take. How can you care for yourself at home? · Your doctor may prescribe a bisphosphonate, such as risedronate (Actonel) or alendronate (Fosamax), for osteoporosis.  If you are taking one of these medicines by mouth:  ? Take your medicine with a full glass of water when you first get up in the morning. ? Do not lie down, eat, drink a beverage, or take any other medicine for at least 30 minutes after taking the drug. This helps prevent stomach problems. ? Do not take your medicine late in the day if you forgot to take it in the morning. Skip it, and take the usual dose the next morning. ? If you have side effects, tell your doctor. Your doctor may prescribe another medicine. · Get enough calcium and vitamin D. The recommended daily allowances (RDAs) for adults younger than age 46 are 1,000 mg of calcium and 600 IU of vitamin D each day. Women ages 46 to 79 need 1,200 mg of calcium and 600 IU of vitamin D each day. Men ages 46 to 79 need 1,000 mg of calcium and 600 IU of vitamin D each day. Adults 71 and older need 1,200 mg of calcium and 800 IU of vitamin D each day. It's not clear if people who already have osteoporosis need more calcium and vitamin D than this. Talk to your doctor about what's right for you.  ? Eat foods rich in calcium, like yogurt, cheese, milk, and dark green vegetables. This is a good way to get the calcium you need. You can get vitamin D from eggs, fatty fish, cereal, and milk. ? Ask your doctor if you need to take a calcium plus vitamin D supplement. You may be able to get enough calcium and vitamin D through your diet. Be careful with supplements. Adults ages 23 to 48 should not get more than 2,500 mg of calcium and 4,000 IU of vitamin D each day, whether it is from supplements and/or food. Adults ages 46 and older should not get more than 2,000 mg of calcium and 4,000 IU of vitamin D each day from supplements and/or food. · Limit alcohol to 2 drinks a day for men and 1 drink a day for women. Too much alcohol can cause health problems. · Do not smoke. Smoking puts you at a much higher risk for osteoporosis.  If you need help quitting, talk to your doctor about stop-smoking programs and medicines. These can increase your chances of quitting for good. · Get regular bone-building exercise. Weight-bearing and resistance exercises keep bones healthy by working the muscles and bones against gravity. Start out at an exercise level that feels right for you. Add a little at a time until you can do the following:  ? Do 30 minutes of weight-bearing exercise on most days of the week. Walking, jogging, stair climbing, and dancing are good choices. ? Do resistance exercises with weights or elastic bands 2 to 3 days a week. · Reduce your risk of falls:  ? Wear supportive shoes with low heels and nonslip soles. ? Use a cane or walker, if you need it. Use shower chairs and bath benches. Put in handrails on stairways, around your shower or tub area, and near the toilet. ? Keep stairs, porches, and walkways well lit. Use night-lights. ? Remove throw rugs and other objects that are in the way. ? Avoid icy, wet, or slippery surfaces. ? Keep a cordless phone and a flashlight with new batteries by your bed. When should you call for help? Watch closely for changes in your health, and be sure to contact your doctor if you have any problems. Where can you learn more? Go to https://EmpressrjahairaCoolSystems.Ulta Beauty. org and sign in to your [employer] account. Enter K100 in the [facility] box to learn more about \"Osteoporosis: Care Instructions. \"     If you do not have an account, please click on the \"Sign Up Now\" link. Current as of: December 7, 2020               Content Version: 12.9  © 4526-6476 [employer]ell[employer]. Care instructions adapted under license by [facility] ([facility]). If you have questions about a medical condition or this instruction, always ask your healthcare professional. [address] any warranty or liability for your use of this information.          Patient Education        Preventing Osteoporosis: Care Instructions  Your Care Instructions     Osteoporosis means the bones are weak and thin enough that they can break easily. The older you are, the more likely you are to get osteoporosis. But with plenty of calcium, vitamin D, and exercise, you can help prevent osteoporosis. The preteen and teen years are a key time for bone building. With the help of calcium, vitamin D, and exercise in those early years and beyond, the bones reach their peak density and strength by age 27. After age 27, your bones naturally start to thin and weaken. The stronger your bones are at around age 27, the lower your risk for osteoporosis. But no matter what your age and risk are, your bones still need calcium, vitamin D, and exercise to stay strong. Also avoid smoking, and limit alcohol. Smoking and heavy alcohol use can make your bones thinner. Talk to your doctor about any special risks you might have, such as having a close relative with osteoporosis or taking a medicine that can weaken bones. Your doctor can tell you the best ways to protect your bones from thinning. Follow-up care is a key part of your treatment and safety. Be sure to make and go to all appointments, and call your doctor if you are having problems. It's also a good idea to know your test results and keep a list of the medicines you take. How can you care for yourself at home? Get enough calcium and vitamin D.  · Eat foods rich in calcium, like yogurt, cheese, milk, and dark green vegetables. · Eat foods rich in vitamin D, like eggs, fatty fish, cereal, and fortified milk. · Get some sunshine. Your body uses sunshine to make its own vitamin D.  · Talk to your doctor about taking a calcium plus vitamin D supplement if you don't think you are getting enough through your diet and sunshine. · Get regular bone-building exercise. Walking, jogging, dancing, and lifting weights can make your bones stronger. · Limit alcohol. Drink no more than 1 alcohol drink a day if you are a woman.  Drink no more than 2 alcohol drinks a day if you are a man. · Do not smoke. Smoking can make bones thin faster. If you need help quitting, talk to your doctor about stop-smoking programs and medicines. These can increase your chances of quitting for good. When should you call for help? Watch closely for changes in your health, and be sure to contact your doctor if you have any problems. Where can you learn more? Go to https://IEC Technology CopeRevolver.beBetter Health. org and sign in to your [employer] account. Enter U761 in the [employer] Noted box to learn more about \"Preventing Osteoporosis: Care Instructions. \"     If you do not have an account, please click on the \"Sign Up Now\" link. Current as of: December 7, 2020               Content Version: 12.9  © 2006-2021 Solum. Care instructions adapted under license by [address] If you have questions about a medical condition or this instruction, always ask your healthcare professional. [place] [address] any warranty or liability for your use of this information. Patient Education        Learning About Breast Cancer Screening  What is breast cancer screening? Breast cancer occurs when cells that are not normal grow in one or both of your breasts. Screening tests can help find breast cancer early. Cancer is easier to treat when it's found early. Having concerns about breast cancer is common. That's why it's important to talk with your doctor about when to start and how often to get screened for breast cancer. How is breast cancer screening done? Several screening tests can be used to check for breast cancer. Mammograms. These tests check for signs of cancer using X-rays. They can show tumors that are too small for you or your doctor to feel. During a mammogram, a machine squeezes your breasts to make them flatter and easier to X-ray.  At least two pictures are taken of each breast. One is taken from the top and one from the side. 3-D mammograms. These tests are also called digital breast tomosynthesis. Your breast is positioned on a flat plate. A top plate is pressed against your breast to keep it in position. The X-ray arm then moves in an arc above the breast and takes many pictures. A computer uses these X-rays to create a three-dimensional image. Clinical breast exam.   In this exam, your doctor carefully feels your breasts and under your arms to check for lumps or other changes. Who should be screened for breast cancer? Experts agree that mammograms are the best screening test for people at average risk of breast cancer. But they don't all agree on the age at which screening should start. And they don't agree on whether it's better to be screened every year or every two years. Here are some of the recommendations from experts:  · Start by age 36 and have a mammogram each year. · Start at age 39 and have a mammogram each year. · Start at age 48 and have a mammogram every 2 years. When to stop having mammograms is another decision. You and your doctor can decide on the right age to start and stop screening based on your personal preferences and overall health. What is your risk for breast cancer? If you don't already know your risk of breast cancer, you can ask your doctor about it. You can also look it up at www.cancer.gov/bcrisktool/. If your doctor says that you have a high or very high risk, ask about ways to reduce your risk. These could include getting extra screening, taking medicine, or having surgery. If you have a strong family history of breast cancer, ask your doctor about genetic testing. What steps can you take to stay healthy? Some things that increase your risk of breast cancer, such as your age and being female, cannot be controlled. But you can do some things to stay as healthy as you can. · Learn what your breasts normally look and feel like. If you notice any changes, tell your doctor.   · If you drink alcohol, limit how much you drink. Any amount of alcohol may increase your risk for some types of cancer. · If you smoke, quit. When you quit smoking, you lower your chances of getting many types of cancer. You can also do your best to eat well, be active, and stay at a healthy weight. Eating healthy foods and being active every day, as well as staying at a healthy weight, may help prevent cancer. Where can you learn more? Go to https://autoGraph.Tucker Blair. org and sign in to your [employer] account. Enter C225 in the [employer] box to learn more about \"Learning About Breast Cancer Screening. \"     If you do not have an account, please click on the \"Sign Up Now\" link. Current as of: December 17, 2020               Content Version: 12.9  © 2006-2021 Personaling. Care instructions adapted under license by [facility] ([facility]). If you have questions about a medical condition or this instruction, always ask your healthcare professional. [place] [address] any warranty or liability for your use of this information. Patient Education        Pneumococcal Polysaccharide Vaccine: What You Need to Know  Why get vaccinated? Pneumococcal polysaccharide vaccine (PPSV23) can prevent pneumococcal disease. Pneumococcal disease refers to any illness caused by pneumococcal bacteria. These bacteria can cause many types of illnesses, including pneumonia, which is an infection of the lungs. Pneumococcal bacteria are one of the most common causes of pneumonia. Besides pneumonia, pneumococcal bacteria can also cause:  · Ear infections,  · Sinus infections  · Meningitis (infection of the tissue covering the brain and spinal cord)  · Bacteremia (bloodstream infection)  Anyone can get pneumococcal disease, but children under 3years of age, people with certain medical conditions, adults 72 years or older, and cigarette smokers are at the highest risk.   Most pneumococcal infections are mild. However, some can result in long-term problems, such as brain damage or hearing loss. Meningitis, bacteremia, and pneumonia caused by pneumococcal disease can be fatal.  PPSV23  PPSV23 protects against 23 types of bacteria that cause pneumococcal disease. PPSV23 is recommended for:  · All adults 72 years or older,  · Anyone 2 years or older with certain medical conditions that can lead to an increased risk for pneumococcal disease. Most people need only one dose of PPSV23. A second dose of PPSV23, and another type of pneumococcal vaccine called PCV13, are recommended for certain high-risk groups. Your health care provider can give you more information. People 65 years or older should get a dose of PPSV23 even if they have already gotten one or more doses of the vaccine before they turned 65. Talk with your health care provider  Tell your vaccine provider if the person getting the vaccine:  · Has had an allergic reaction after a previous dose of PPSV23, or has any severe, life-threatening allergies. In some cases, your health care provider may decide to postpone PPSV23 vaccination to a future visit. People with minor illnesses, such as a cold, may be vaccinated. People who are moderately or severely ill should usually wait until they recover before getting PPSV23. Your health care provider can give you more information. Risks of a vaccine reaction  · Redness or pain where the shot is given, feeling tired, fever, or muscle aches can happen after PPSV23. People sometimes faint after medical procedures, including vaccination. Tell your provider if you feel dizzy or have vision changes or ringing in the ears. As with any medicine, there is a very remote chance of a vaccine causing a severe allergic reaction, other serious injury, or death. What if there is a serious problem? An allergic reaction could occur after the vaccinated person leaves the clinic.  If you see signs of a severe allergic reaction (hives, swelling of the face and throat, difficulty breathing, a fast heartbeat, dizziness, or weakness), call 9-1-1 and get the person to the nearest hospital.  For other signs that concern you, call your health care provider. Adverse reactions should be reported to the Vaccine Adverse Event Reporting System (VAERS). Your health care provider will usually file this report, or you can do it yourself. Visit the VAERS website at www.vaers. hhs.gov at www.vaers. hhs.gov or call 5-504.704.3043. VAERS is only for reporting reactions, and VAERS staff do not give medical advice. How can I learn more? · Ask your health care provider. · Call your local or state health department. · Contact the Centers for Disease Control and Prevention (CDC):  ? Call 0-489.996.7856 (8-342-UWW-INFO) or  ? Visit CDC's website at www.cdc.gov/vaccines  Vaccine Information Statement  PPSV23 Vaccine  10/30/2019  [facility] for Disease Control and Prevention  Many Vaccine Information Statements are available in Malian and other languages. See www.immunize.org/vis. Hojas de información Sobre Vacunas están disponibles en español y en muchos otros idiomas. Visite Paul.si. Care instructions adapted under license by [address] If you have questions about a medical condition or this instruction, always ask your healthcare professional. [place] [address] any warranty or liability for your use of this information.

## 2021-08-24 ENCOUNTER — HOSPITAL ENCOUNTER (OUTPATIENT)
Age: 71
Setting detail: SPECIMEN
Discharge: HOME | End: 2021-08-24
Payer: COMMERCIAL

## 2021-08-24 DIAGNOSIS — E03.9 HYPOTHYROIDISM, UNSPECIFIED TYPE: ICD-10-CM

## 2021-08-24 LAB
THYROXINE, FREE: 1.44 NG/DL (ref 0.93–1.7)
TSH SERPL DL<=0.05 MIU/L-ACNC: 0.51 MIU/L (ref 0.3–5)

## 2022-07-29 ENCOUNTER — OFFICE VISIT (OUTPATIENT)
Dept: PRIMARY CARE CLINIC | Age: 72
End: 2022-07-29
Payer: MEDICARE

## 2022-07-29 VITALS
SYSTOLIC BLOOD PRESSURE: 120 MMHG | OXYGEN SATURATION: 98 % | HEART RATE: 69 BPM | BODY MASS INDEX: 26.54 KG/M2 | DIASTOLIC BLOOD PRESSURE: 72 MMHG | WEIGHT: 149.8 LBS | HEIGHT: 63 IN

## 2022-07-29 DIAGNOSIS — E03.9 HYPOTHYROIDISM, UNSPECIFIED TYPE: Primary | ICD-10-CM

## 2022-07-29 DIAGNOSIS — Z12.31 BREAST CANCER SCREENING BY MAMMOGRAM: ICD-10-CM

## 2022-07-29 DIAGNOSIS — Z23 IMMUNIZATION DUE: ICD-10-CM

## 2022-07-29 PROCEDURE — 90677 PCV20 VACCINE IM: CPT | Performed by: FAMILY MEDICINE

## 2022-07-29 PROCEDURE — 99214 OFFICE O/P EST MOD 30 MIN: CPT | Performed by: FAMILY MEDICINE

## 2022-07-29 PROCEDURE — 1123F ACP DISCUSS/DSCN MKR DOCD: CPT | Performed by: FAMILY MEDICINE

## 2022-07-29 PROCEDURE — G0009 ADMIN PNEUMOCOCCAL VACCINE: HCPCS | Performed by: FAMILY MEDICINE

## 2022-07-29 RX ORDER — LEVOTHYROXINE SODIUM 0.07 MG/1
TABLET ORAL
Qty: 90 TABLET | Refills: 3 | Status: SHIPPED | OUTPATIENT
Start: 2022-07-29

## 2022-07-29 ASSESSMENT — PATIENT HEALTH QUESTIONNAIRE - PHQ9
SUM OF ALL RESPONSES TO PHQ QUESTIONS 1-9: 0
2. FEELING DOWN, DEPRESSED OR HOPELESS: 0
SUM OF ALL RESPONSES TO PHQ9 QUESTIONS 1 & 2: 0
1. LITTLE INTEREST OR PLEASURE IN DOING THINGS: 0

## 2022-07-29 ASSESSMENT — ENCOUNTER SYMPTOMS: RESPIRATORY NEGATIVE: 1

## 2022-07-29 NOTE — PROGRESS NOTES
[facility]  [address] [place] [id number]  Dept: Michelle Dillard is a 71 y.o. female Established patient, who presents today for her medical conditions/complaintsas noted below. Chief Complaint   Patient presents with    Hypothyroidism     Pt here today for f/u       HPI:     HPI  Feels well  No med SE  Reviewed prior notes None  Reviewed previous Labs and Imaging    LDL Calculated (mg/dL)   Date Value   02/15/2018 148       (goal LDL is <100)   TSH (mIU/L)   Date Value   08/24/2021 0.51     BP Readings from Last 3 Encounters:   07/29/22 120/72   07/29/21 138/74   06/02/20 118/72          (goal 120/80)    Past Medical History:   Diagnosis Date    Thyroid disease       Past Surgical History:   Procedure Laterality Date    DILATION AND CURETTAGE OF UTERUS  1980's    TONSILLECTOMY      TUBAL LIGATION         Family History   Problem Relation Age of Onset    Alzheimer's Disease Father     Alzheimer's Disease Maternal Grandmother     Heart Disease Paternal Grandfather        Social History     Tobacco Use    Smoking status: Never    Smokeless tobacco: Never   Substance Use Topics    Alcohol use: Yes     Comment: rarely      Current Outpatient Medications   Medication Sig Dispense Refill    levothyroxine (SYNTHROID) 75 MCG tablet One tab daily Mon- Saturday and 1/2 tab on Sunday 90 tablet 3    levocetirizine (XYZAL) 5 MG tablet Take 5 mg by mouth nightly      Multiple Vitamins-Minerals (THERAPEUTIC MULTIVITAMIN-MINERALS) tablet Take 1 tablet by mouth daily       No current facility-administered medications for this visit.      Allergies   Allergen Reactions    Eggs Or Egg-Derived Products Other (See Comments)     Skin allergy as a child  Can eat eggs occasionally     Sulfa Antibiotics Rash       Health Maintenance   Topic Date Due    Hepatitis C screen  Never done    DTaP/Tdap/Td vaccine (1 - Tdap) Never done    Breast cancer screen  Never done Shingles vaccine (1 of 2) Never done    DEXA (modify frequency per FRAX score)  Never done    COVID-19 Vaccine (3 - Booster for Moderna series) 08/23/2021    Depression Screen  07/29/2022    Flu vaccine (1) 09/01/2022    Lipids  02/15/2023    Colorectal Cancer Screen  05/21/2024    Pneumococcal 65+ years Vaccine  Completed    Hepatitis A vaccine  Aged Out    Hepatitis B vaccine  Aged Out    Hib vaccine  Aged Out    Meningococcal (ACWY) vaccine  Aged Out       Subjective:      Review of Systems   Respiratory: Negative. Cardiovascular: Negative. Neurological:  Negative for dizziness and light-headedness. Objective:     /72   Pulse 69   Ht 5' 3\" (1.6 m)   Wt 149 lb 12.8 oz (67.9 kg)   SpO2 98%   BMI 26.54 kg/m²   Physical Exam  Vitals and nursing note reviewed. Constitutional:       General: She is not in acute distress. Appearance: She is well-developed. She is not ill-appearing. HENT:      Head: Normocephalic and atraumatic. Right Ear: External ear normal.      Left Ear: External ear normal.   Eyes:      General: No scleral icterus. Right eye: No discharge. Left eye: No discharge. Conjunctiva/sclera: Conjunctivae normal.   Neck:      Thyroid: No thyromegaly. Trachea: No tracheal deviation. Cardiovascular:      Rate and Rhythm: Normal rate and regular rhythm. Heart sounds: Normal heart sounds. Pulmonary:      Effort: Pulmonary effort is normal. No respiratory distress. Breath sounds: Normal breath sounds. No wheezing. Musculoskeletal:      Right lower leg: No edema. Left lower leg: No edema. Lymphadenopathy:      Cervical: No cervical adenopathy. Skin:     General: Skin is warm. Findings: No rash. Neurological:      Mental Status: She is alert and oriented to person, place, and time. Psychiatric:         Mood and Affect: Mood normal.         Behavior: Behavior normal.         Thought Content:  Thought content normal. Assessment:       Diagnosis Orders   1. Hypothyroidism, unspecified type  TSH    T4, Free    levothyroxine (SYNTHROID) 75 MCG tablet      2. Breast cancer screening by mammogram  MEET DIGITAL SCREEN W OR WO CAD BILATERAL      3. Immunization due  Pneumococcal, PCV20, PREVNAR 21, (age 25 yrs+), IM, PF             Plan:      No follow-ups on file. Orders Placed This Encounter   Procedures    MEET DIGITAL SCREEN W OR WO CAD BILATERAL     Standing Status:   Future     Standing Expiration Date:   7/29/2023     Scheduling Instructions:      Dx Code  Z12.31     Order Specific Question:   Reason for exam:     Answer:   screening    Pneumococcal, PCV20, PREVNAR 21, (age 25 yrs+), IM, PF    TSH     Standing Status:   Future     Standing Expiration Date:   7/29/2023    T4, Free     Standing Status:   Future     Standing Expiration Date:   7/29/2023     Orders Placed This Encounter   Medications    levothyroxine (SYNTHROID) 75 MCG tablet     Sig: One tab daily Mon- Saturday and 1/2 tab on Sunday     Dispense:  90 tablet     Refill:  3         Patient given educationalmaterials - see patient instructions. Discussed use, benefit, and side effectsof prescribed medications. All patient questions answered. Pt voiced understanding. Reviewed health maintenance. Instructed to continue current medications, diet andexercise. Patient agreed with treatment plan. Follow up as directed.      Electronicallysigned by Alistair Dey MD on 7/29/2022 at 4:44 PM

## 2022-08-19 DIAGNOSIS — E03.9 HYPOTHYROIDISM, UNSPECIFIED TYPE: ICD-10-CM

## 2022-08-19 LAB
THYROXINE, FREE: 1.21 NG/DL (ref 0.93–1.7)
TSH SERPL DL<=0.05 MIU/L-ACNC: 0.48 UIU/ML (ref 0.3–5)

## 2023-06-21 ENCOUNTER — TELEPHONE (OUTPATIENT)
Dept: FAMILY MEDICINE CLINIC | Age: 73
End: 2023-06-21

## 2023-06-21 NOTE — TELEPHONE ENCOUNTER
Outreach call to set up pcp appt. Lvm to call office to set up. Also ask about having awv since has not had yet.

## 2023-07-10 ENCOUNTER — TELEPHONE (OUTPATIENT)
Dept: PRIMARY CARE CLINIC | Age: 73
End: 2023-07-10

## 2023-07-10 DIAGNOSIS — E03.9 HYPOTHYROIDISM, UNSPECIFIED TYPE: ICD-10-CM

## 2023-07-10 RX ORDER — LEVOTHYROXINE SODIUM 0.07 MG/1
TABLET ORAL
Qty: 90 TABLET | Refills: 0 | Status: SHIPPED | OUTPATIENT
Start: 2023-07-10

## 2023-07-10 NOTE — TELEPHONE ENCOUNTER
----- Message from Glenn Muñiz sent at 7/10/2023  1:47 PM EDT -----  Subject: Refill Request    QUESTIONS  Name of Medication? levothyroxine (SYNTHROID) 75 MCG tablet  Patient-reported dosage and instructions? One tab daily Mon- Saturday and   1/2 tab on Sunday  How many days do you have left? 30  Preferred Pharmacy? [address] [place] [address] [address]  Pharmacy phone number (if available)? [phone]  Additional Information for Provider? patient has an appt. for this   medication refill on 8/18/2023,states she might be out by then, wants to   know if the pcp will refill this medication prior to appt. patient states   the medication is also a 90 count refill.  ---------------------------------------------------------------------------  --------------  CALL BACK INFO  What is the best way for the office to contact you? OK to leave message on   voicemail  Preferred Call Back Phone Number? [phone]  ---------------------------------------------------------------------------  --------------  SCRIPT ANSWERS  Relationship to Patient?  Self

## 2023-07-10 NOTE — TELEPHONE ENCOUNTER
Over due for labs also  Do blood work for thyroid in the am, do not take thyroid pill that particular morning until after the lab draw.

## 2023-08-18 ENCOUNTER — OFFICE VISIT (OUTPATIENT)
Dept: PRIMARY CARE CLINIC | Age: 73
End: 2023-08-18

## 2023-08-18 VITALS
OXYGEN SATURATION: 99 % | BODY MASS INDEX: 27.11 KG/M2 | HEIGHT: 63 IN | SYSTOLIC BLOOD PRESSURE: 140 MMHG | DIASTOLIC BLOOD PRESSURE: 90 MMHG | HEART RATE: 75 BPM | WEIGHT: 153 LBS

## 2023-08-18 DIAGNOSIS — Z12.31 BREAST CANCER SCREENING BY MAMMOGRAM: ICD-10-CM

## 2023-08-18 DIAGNOSIS — E03.9 HYPOTHYROIDISM, UNSPECIFIED TYPE: ICD-10-CM

## 2023-08-18 DIAGNOSIS — E03.9 HYPOTHYROIDISM, UNSPECIFIED TYPE: Primary | ICD-10-CM

## 2023-08-18 RX ORDER — LEVOTHYROXINE SODIUM 0.07 MG/1
TABLET ORAL
Qty: 90 TABLET | Refills: 1 | Status: SHIPPED | OUTPATIENT
Start: 2023-08-18

## 2023-08-18 SDOH — ECONOMIC STABILITY: HOUSING INSECURITY
IN THE LAST 12 MONTHS, WAS THERE A TIME WHEN YOU DID NOT HAVE A STEADY PLACE TO SLEEP OR SLEPT IN A SHELTER (INCLUDING NOW)?: NO

## 2023-08-18 SDOH — ECONOMIC STABILITY: INCOME INSECURITY: HOW HARD IS IT FOR YOU TO PAY FOR THE VERY BASICS LIKE FOOD, HOUSING, MEDICAL CARE, AND HEATING?: NOT HARD AT ALL

## 2023-08-18 SDOH — ECONOMIC STABILITY: FOOD INSECURITY: WITHIN THE PAST 12 MONTHS, THE FOOD YOU BOUGHT JUST DIDN'T LAST AND YOU DIDN'T HAVE MONEY TO GET MORE.: NEVER TRUE

## 2023-08-18 SDOH — ECONOMIC STABILITY: FOOD INSECURITY: WITHIN THE PAST 12 MONTHS, YOU WORRIED THAT YOUR FOOD WOULD RUN OUT BEFORE YOU GOT MONEY TO BUY MORE.: NEVER TRUE

## 2023-08-18 ASSESSMENT — PATIENT HEALTH QUESTIONNAIRE - PHQ9
1. LITTLE INTEREST OR PLEASURE IN DOING THINGS: 0
2. FEELING DOWN, DEPRESSED OR HOPELESS: 0
SUM OF ALL RESPONSES TO PHQ9 QUESTIONS 1 & 2: 0
SUM OF ALL RESPONSES TO PHQ QUESTIONS 1-9: 0

## 2023-08-18 NOTE — PROGRESS NOTES
Behavior: Behavior normal.         Thought Content: Thought content normal.       Assessment:       Diagnosis Orders   1. Hypothyroidism, unspecified type  T4, Free    TSH      2. Breast cancer screening by mammogram  MEET DIGITAL SCREEN W OR WO CAD BILATERAL           Plan:      Return in about 4 months (around 12/18/2023) for hypertension. Orders Placed This Encounter   Procedures    MEET DIGITAL SCREEN W OR WO CAD BILATERAL     Standing Status:   Future     Standing Expiration Date:   8/18/2024     Scheduling Instructions:      Dx Code  Z12.31     Order Specific Question:   Reason for exam:     Answer:   screening    T4, Free     Standing Status:   Future     Standing Expiration Date:   8/18/2024    TSH     Standing Status:   Future     Standing Expiration Date:   8/18/2024     No orders of the defined types were placed in this encounter. Patient given educationalmaterials - see patient instructions. Discussed use, benefit, and side effectsof prescribed medications. All patient questions answered. Pt voiced understanding. Reviewed health maintenance. Instructed to continue current medications, diet andexercise. Patient agreed with treatment plan. Follow up as directed.      Electronicallysigned by Fe Hardwick MD on 8/18/2023 at 9:36 AM

## 2023-09-08 DIAGNOSIS — E03.9 HYPOTHYROIDISM, UNSPECIFIED TYPE: ICD-10-CM

## 2023-09-08 LAB
T4 FREE: 1.4 NG/DL (ref 0.9–1.7)
TSH SERPL DL<=0.05 MIU/L-ACNC: 0.47 UIU/ML (ref 0.3–5)

## 2024-03-25 ENCOUNTER — APPOINTMENT (OUTPATIENT)
Dept: GENERAL RADIOLOGY | Age: 74
End: 2024-03-25
Payer: COMMERCIAL

## 2024-03-25 ENCOUNTER — HOSPITAL ENCOUNTER (EMERGENCY)
Age: 74
Discharge: HOME | End: 2024-03-25
Attending: EMERGENCY MEDICINE
Payer: COMMERCIAL

## 2024-03-25 VITALS
RESPIRATION RATE: 18 BRPM | WEIGHT: 148 LBS | OXYGEN SATURATION: 99 % | HEART RATE: 72 BPM | HEIGHT: 63 IN | BODY MASS INDEX: 26.22 KG/M2 | TEMPERATURE: 97.9 F

## 2024-03-25 DIAGNOSIS — S80.02XA CONTUSION OF LEFT KNEE, INITIAL ENCOUNTER: Primary | ICD-10-CM

## 2024-03-25 DIAGNOSIS — S80.01XA CONTUSION OF RIGHT KNEE, INITIAL ENCOUNTER: ICD-10-CM

## 2024-03-25 PROCEDURE — 73564 X-RAY EXAM KNEE 4 OR MORE: CPT

## 2024-03-25 PROCEDURE — 99283 EMERGENCY DEPT VISIT LOW MDM: CPT | Performed by: EMERGENCY MEDICINE

## 2024-03-25 ASSESSMENT — PAIN - FUNCTIONAL ASSESSMENT: PAIN_FUNCTIONAL_ASSESSMENT: 0-10

## 2024-03-25 ASSESSMENT — PAIN SCALES - GENERAL: PAINLEVEL_OUTOF10: 5

## 2024-03-25 ASSESSMENT — PAIN DESCRIPTION - LOCATION: LOCATION: KNEE

## 2024-03-25 ASSESSMENT — LIFESTYLE VARIABLES
HOW OFTEN DO YOU HAVE A DRINK CONTAINING ALCOHOL: NEVER
HOW MANY STANDARD DRINKS CONTAINING ALCOHOL DO YOU HAVE ON A TYPICAL DAY: PATIENT DOES NOT DRINK

## 2024-03-25 ASSESSMENT — PAIN DESCRIPTION - ORIENTATION: ORIENTATION: LEFT

## 2024-03-25 NOTE — ED PROVIDER NOTES
[facility] Emergency Department  [address]  [facility] [address]  Phone: [phone]  Fax: [phone]        [facility] EMERGENCY DEPARTMENT  EMERGENCY DEPARTMENT ENCOUNTER      Pt Name: Anaya Carter  MRN: [id number]  Birthdate 1950  Date of evaluation: 3/25/2024  Provider: Goran Flowers DO    CHIEF COMPLAINT       Chief Complaint   Patient presents with    Fall     Bilat knee pain         HISTORY OF PRESENT ILLNESS   (Location/Symptom, Timing/Onset,Context/Setting, Quality, Duration, Modifying Factors, Severity)  Note limiting factors.   Anaya Carter is a 74 y.o. female who presents to the emergency department for the evaluation of bilateral knee pain sustained in a mechanical fall at work today.  She tripped over something and fell onto her knees.  She is having a little bit of pain in that area.  No numbness or weakness in the legs.  No other injury or complaint.  This happened just prior to arrival.  Is a little bit worse with walking and certain movement    Nursing Notes were reviewed.    REVIEW OF SYSTEMS    (2-9systems for level 4, 10 or more for level 5)     Review of Systems   Constitutional:  Negative for fever.   Musculoskeletal:         Bilateral knee pain   Neurological:  Negative for weakness and numbness.       Except asnoted above the remainder of the review of systems was reviewed and negative.       PAST MEDICAL HISTORY     Past Medical History:   Diagnosis Date    Thyroid disease          SURGICAL HISTORY       Past Surgical History:   Procedure Laterality Date    DILATION AND CURETTAGE OF UTERUS  1980's    TONSILLECTOMY      TUBAL LIGATION           CURRENT MEDICATIONS     Previous Medications    LEVOCETIRIZINE (XYZAL) 5 MG TABLET    Take 1 tablet by mouth nightly    LEVOTHYROXINE (SYNTHROID) 75 MCG TABLET    One tab daily Mon- Saturday and 1/2 tab on Sunday    MULTIPLE VITAMINS-MINERALS (THERAPEUTIC MULTIVITAMIN-MINERALS) TABLET    Take 1

## 2024-04-05 DIAGNOSIS — E03.9 HYPOTHYROIDISM, UNSPECIFIED TYPE: ICD-10-CM

## 2024-04-05 RX ORDER — LEVOTHYROXINE SODIUM 0.07 MG/1
TABLET ORAL
Qty: 90 TABLET | Refills: 0 | Status: SHIPPED | OUTPATIENT
Start: 2024-04-05

## 2024-04-05 NOTE — TELEPHONE ENCOUNTER
Patient will not schedule as she states she is not due for an appointment until August.  Patient scheduled an appointment for August 2024.

## 2024-04-05 NOTE — TELEPHONE ENCOUNTER
LVM letting patient know that her prescription was sent to her pharmacy and that there is an order for her labs in the system.

## 2024-04-05 NOTE — TELEPHONE ENCOUNTER
Patient did schedule for appt August 2, 2024.  She would like an order to get blood draw prior to appointment.

## 2024-04-30 ENCOUNTER — APPOINTMENT (OUTPATIENT)
Dept: CT IMAGING | Age: 74
End: 2024-04-30
Payer: MEDICARE

## 2024-04-30 ENCOUNTER — HOSPITAL ENCOUNTER (EMERGENCY)
Age: 74
Discharge: HOME | End: 2024-04-30
Attending: EMERGENCY MEDICINE
Payer: MEDICARE

## 2024-04-30 VITALS
SYSTOLIC BLOOD PRESSURE: 142 MMHG | OXYGEN SATURATION: 100 % | BODY MASS INDEX: 25.59 KG/M2 | HEIGHT: 64 IN | HEART RATE: 66 BPM | RESPIRATION RATE: 18 BRPM | TEMPERATURE: 97.5 F | WEIGHT: 149.91 LBS | DIASTOLIC BLOOD PRESSURE: 74 MMHG

## 2024-04-30 DIAGNOSIS — M50.30 DDD (DEGENERATIVE DISC DISEASE), CERVICAL: ICD-10-CM

## 2024-04-30 DIAGNOSIS — E04.1 THYROID NODULE: ICD-10-CM

## 2024-04-30 DIAGNOSIS — M54.12 CERVICAL RADICULOPATHY: Primary | ICD-10-CM

## 2024-04-30 PROCEDURE — 99284 EMERGENCY DEPT VISIT MOD MDM: CPT

## 2024-04-30 PROCEDURE — 72125 CT NECK SPINE W/O DYE: CPT

## 2024-04-30 RX ORDER — IBUPROFEN 600 MG/1
600 TABLET ORAL 3 TIMES DAILY PRN
Qty: 30 TABLET | Refills: 0 | Status: SHIPPED | OUTPATIENT
Start: 2024-04-30

## 2024-04-30 RX ORDER — CYCLOBENZAPRINE HCL 10 MG
10 TABLET ORAL 3 TIMES DAILY PRN
Qty: 21 TABLET | Refills: 0 | Status: SHIPPED | OUTPATIENT
Start: 2024-04-30 | End: 2024-05-10

## 2024-04-30 ASSESSMENT — PAIN DESCRIPTION - DESCRIPTORS: DESCRIPTORS: ACHING

## 2024-04-30 ASSESSMENT — ENCOUNTER SYMPTOMS
NAUSEA: 0
BACK PAIN: 0
DIARRHEA: 0
COUGH: 0
SORE THROAT: 0
VOMITING: 0
ABDOMINAL PAIN: 0
SHORTNESS OF BREATH: 0

## 2024-04-30 ASSESSMENT — PAIN - FUNCTIONAL ASSESSMENT: PAIN_FUNCTIONAL_ASSESSMENT: 0-10

## 2024-04-30 ASSESSMENT — PAIN DESCRIPTION - ORIENTATION: ORIENTATION: RIGHT;POSTERIOR

## 2024-04-30 ASSESSMENT — PAIN DESCRIPTION - PAIN TYPE: TYPE: ACUTE PAIN

## 2024-04-30 ASSESSMENT — PAIN SCALES - GENERAL: PAINLEVEL_OUTOF10: 4

## 2024-04-30 ASSESSMENT — PAIN DESCRIPTION - LOCATION: LOCATION: NECK

## 2024-04-30 NOTE — ED PROVIDER NOTES
Emergency Department       I reviewed the mid level provider's note and agree with the documented findings and we have discussed the plan of care. I have reviewed the emergency nurses triage note. I agree with the chief complaint, past medical history, past surgical history, allergies, medications, social and family history as documented unless otherwise noted below.      Gaston Tay, DO  04/30/24 1750    
physician and not a emergent MRI is warranted.  Reasons to return to the emergency department state on the discharge paperwork she is discharged ambulatory in no acute distress with physiologic vital signs.  Differential diagnosis: Cervical radiculopathy, degenerative disc disease, thyroid nodule, cervical strain, muscle spasm.       Amount and/or Complexity of Data Reviewed  Radiology: ordered.    Risk  Prescription drug management.            REASSESSMENT          CRITICAL CARE TIME   Total Critical Care time was  minutes, excluding separately reportable procedures.  There was a high probability of clinically significant/life threatening deterioration in the patient's condition which required my urgent intervention.      CONSULTS:  None    PROCEDURES:  Unless otherwise noted below, none     Procedures        FINAL IMPRESSION      1. Cervical radiculopathy    2. Thyroid nodule    3. DDD (degenerative disc disease), cervical          DISPOSITION/PLAN   DISPOSITION Decision To Discharge 04/30/2024 07:39:19 PM      PATIENT REFERRED TO:  Maisha Hodgson MD  [address]  [address]  [place] [id number]  [phone]    Schedule an appointment as soon as possible for a visit   PLEASE FOLLOW WITH YOUR FAMILY PHYSICIAN FOR THYROID US      DISCHARGE MEDICATIONS:  Discharge Medication List as of 4/30/2024  7:56 PM        START taking these medications    Details   cyclobenzaprine (FLEXERIL) 10 MG tablet Take 1 tablet by mouth 3 times daily as needed for Muscle spasms, Disp-21 tablet, R-0Normal      ibuprofen (ADVIL;MOTRIN) 600 MG tablet Take 1 tablet by mouth 3 times daily as needed for Pain, Disp-30 tablet, R-0Normal           Controlled Substances Monitoring:     RX Monitoring Attestation   5/1/2016   2:52 PM The Prescription Monitoring Report for this patient was reviewed today.       (Please note that portions of this note were completed with a voice recognition program.  Efforts were made to edit the

## 2024-04-30 NOTE — DISCHARGE INSTRUCTIONS
Tylenol as directed over-the-counter to help with pain.    Ibuprofen as prescribed to help with pain, Flexeril muscle relaxant as prescribed to help with muscle tension or spasms.      Return to the ER: Atypical headaches, chest pain or breathing difficulty, arm weakness, numbness; or any other concerning symptoms.

## 2024-05-09 DIAGNOSIS — E04.1 THYROID NODULE: Primary | ICD-10-CM

## 2024-05-09 DIAGNOSIS — E03.9 HYPOTHYROIDISM, UNSPECIFIED TYPE: ICD-10-CM

## 2024-06-21 DIAGNOSIS — E03.9 HYPOTHYROIDISM, UNSPECIFIED TYPE: ICD-10-CM

## 2024-06-21 RX ORDER — LEVOTHYROXINE SODIUM 0.07 MG/1
TABLET ORAL
Qty: 90 TABLET | Refills: 0 | Status: SHIPPED | OUTPATIENT
Start: 2024-06-21

## 2024-06-21 NOTE — TELEPHONE ENCOUNTER
LAST VISIT:   8/18/2023     Future Appointments   Date Time Provider Department Center   8/2/2024  9:20 AM [facility]       Pharmacy verified? Yes    [facility] [id number] - [place], [place] - [address] - P [phone] - F [phone]  [address]  [place] [address]  Phone: [phone] Fax: [phone]

## 2024-08-02 ENCOUNTER — OFFICE VISIT (OUTPATIENT)
Dept: PRIMARY CARE CLINIC | Age: 74
End: 2024-08-02

## 2024-08-02 VITALS
OXYGEN SATURATION: 95 % | DIASTOLIC BLOOD PRESSURE: 82 MMHG | BODY MASS INDEX: 25.95 KG/M2 | SYSTOLIC BLOOD PRESSURE: 136 MMHG | HEIGHT: 64 IN | HEART RATE: 82 BPM | WEIGHT: 152 LBS

## 2024-08-02 DIAGNOSIS — R21 RASH OF HANDS: ICD-10-CM

## 2024-08-02 DIAGNOSIS — E03.9 HYPOTHYROIDISM, UNSPECIFIED TYPE: Primary | ICD-10-CM

## 2024-08-02 DIAGNOSIS — E03.9 HYPOTHYROIDISM, UNSPECIFIED TYPE: ICD-10-CM

## 2024-08-02 DIAGNOSIS — Z12.11 COLON CANCER SCREENING: ICD-10-CM

## 2024-08-02 LAB — TSH SERPL DL<=0.05 MIU/L-ACNC: 0.7 UIU/ML (ref 0.27–4.2)

## 2024-08-02 RX ORDER — LEVOTHYROXINE SODIUM 0.07 MG/1
75 TABLET ORAL DAILY
Qty: 90 TABLET | Refills: 3 | Status: SHIPPED | OUTPATIENT
Start: 2024-08-02

## 2024-08-02 RX ORDER — TRIAMCINOLONE ACETONIDE 5 MG/G
CREAM TOPICAL
Qty: 15 G | Refills: 3 | Status: SHIPPED | OUTPATIENT
Start: 2024-08-02 | End: 2024-08-09

## 2024-08-02 ASSESSMENT — ENCOUNTER SYMPTOMS
ABDOMINAL PAIN: 0
VOMITING: 0
NAUSEA: 0
SHORTNESS OF BREATH: 0

## 2024-08-02 ASSESSMENT — PATIENT HEALTH QUESTIONNAIRE - PHQ9
1. LITTLE INTEREST OR PLEASURE IN DOING THINGS: NOT AT ALL
SUM OF ALL RESPONSES TO PHQ QUESTIONS 1-9: 0
2. FEELING DOWN, DEPRESSED OR HOPELESS: NOT AT ALL
SUM OF ALL RESPONSES TO PHQ9 QUESTIONS 1 & 2: 0
SUM OF ALL RESPONSES TO PHQ QUESTIONS 1-9: 0

## 2024-08-02 NOTE — PROGRESS NOTES
Pulmonary:      Effort: Pulmonary effort is normal. No respiratory distress.      Breath sounds: Normal breath sounds. No wheezing, rhonchi or rales.   Musculoskeletal:      Right lower leg: No edema.      Left lower leg: No edema.   Neurological:      Mental Status: She is alert.   Psychiatric:         Mood and Affect: Mood normal.         Behavior: Behavior normal.         Thought Content: Thought content normal.         Judgment: Judgment normal.         Assessment:       Diagnosis Orders   1. Hypothyroidism, unspecified type  TSH    levothyroxine (SYNTHROID) 75 MCG tablet      2. Colon cancer screening  Fecal DNA Colorectal cancer screening (Cologuard)      3. Rash of hands  triamcinolone (ARISTOCORT) 0.5 % cream           Hypothyroidism appears to be controlled with 75 mcg of levothyroxine, but it has been a year since checked.    Due for mammogram and colorectal cancer screening  Plan:   Reminder about mammogram  Order Cologuard  Renew levothyroxine 75 mcg for hypothyroidism, check TSH    Return in about 3 months (around 11/2/2024) for Medicare Wellness with Dr. Hodgson or myself.    Orders Placed This Encounter   Procedures    Fecal DNA Colorectal cancer screening (Cologuard)    TSH     Standing Status:   Future     Number of Occurrences:   1     Standing Expiration Date:   8/2/2025     Orders Placed This Encounter   Medications    levothyroxine (SYNTHROID) 75 MCG tablet     Sig: Take 1 tablet by mouth Daily     Dispense:  90 tablet     Refill:  3    triamcinolone (ARISTOCORT) 0.5 % cream     Sig: Apply topically 2 times daily. Do not exceed 14 days at a time.     Dispense:  15 g     Refill:  3           Discussed risks and benefits of above plan. All patient questions were answered prior to patient leaving the office, today. Reviewed health maintenance. Instructed regarding current medications, diet, and exercise. Patient agreed with treatment plan.    Electronically signed by Erik Zhao DO on 8/2/2024

## 2025-04-21 ENCOUNTER — TELEPHONE (OUTPATIENT)
Dept: PRIMARY CARE CLINIC | Age: 75
End: 2025-04-21

## 2025-04-21 ENCOUNTER — HOSPITAL ENCOUNTER (EMERGENCY)
Age: 75
Discharge: HOME | End: 2025-04-21
Attending: EMERGENCY MEDICINE
Payer: MEDICARE

## 2025-04-21 VITALS
HEART RATE: 83 BPM | HEIGHT: 63 IN | SYSTOLIC BLOOD PRESSURE: 101 MMHG | BODY MASS INDEX: 26.22 KG/M2 | DIASTOLIC BLOOD PRESSURE: 84 MMHG | OXYGEN SATURATION: 100 % | WEIGHT: 148 LBS

## 2025-04-21 DIAGNOSIS — R22.0 TONGUE SWELLING: Primary | ICD-10-CM

## 2025-04-21 DIAGNOSIS — T78.40XA ALLERGIC REACTION, INITIAL ENCOUNTER: ICD-10-CM

## 2025-04-21 PROCEDURE — 6360000002 HC RX W HCPCS

## 2025-04-21 PROCEDURE — 96374 THER/PROPH/DIAG INJ IV PUSH: CPT

## 2025-04-21 PROCEDURE — 2580000003 HC RX 258: Performed by: EMERGENCY MEDICINE

## 2025-04-21 PROCEDURE — 6360000002 HC RX W HCPCS: Performed by: EMERGENCY MEDICINE

## 2025-04-21 PROCEDURE — 99284 EMERGENCY DEPT VISIT MOD MDM: CPT

## 2025-04-21 PROCEDURE — 96375 TX/PRO/DX INJ NEW DRUG ADDON: CPT

## 2025-04-21 RX ORDER — METHYLPREDNISOLONE SODIUM SUCCINATE 40 MG/ML
40 INJECTION INTRAMUSCULAR; INTRAVENOUS ONCE
Status: COMPLETED | OUTPATIENT
Start: 2025-04-21 | End: 2025-04-21

## 2025-04-21 RX ORDER — METHYLPREDNISOLONE SODIUM SUCCINATE 40 MG/ML
INJECTION INTRAMUSCULAR; INTRAVENOUS
Status: COMPLETED
Start: 2025-04-21 | End: 2025-04-21

## 2025-04-21 RX ORDER — DIPHENHYDRAMINE HYDROCHLORIDE 50 MG/ML
50 INJECTION, SOLUTION INTRAMUSCULAR; INTRAVENOUS ONCE
Status: COMPLETED | OUTPATIENT
Start: 2025-04-21 | End: 2025-04-21

## 2025-04-21 RX ORDER — PREDNISONE 10 MG/1
TABLET ORAL
Qty: 20 TABLET | Refills: 0 | Status: SHIPPED | OUTPATIENT
Start: 2025-04-21 | End: 2025-05-01

## 2025-04-21 RX ORDER — 0.9 % SODIUM CHLORIDE 0.9 %
1000 INTRAVENOUS SOLUTION INTRAVENOUS ONCE
Status: COMPLETED | OUTPATIENT
Start: 2025-04-21 | End: 2025-04-21

## 2025-04-21 RX ADMIN — METHYLPREDNISOLONE SODIUM SUCCINATE 120 MG: 40 INJECTION, POWDER, LYOPHILIZED, FOR SOLUTION INTRAMUSCULAR; INTRAVENOUS at 11:50

## 2025-04-21 RX ADMIN — METHYLPREDNISOLONE SODIUM SUCCINATE 40 MG: 40 INJECTION, POWDER, LYOPHILIZED, FOR SOLUTION INTRAMUSCULAR; INTRAVENOUS at 11:45

## 2025-04-21 RX ADMIN — METHYLPREDNISOLONE SODIUM SUCCINATE 40 MG: 40 INJECTION, POWDER, LYOPHILIZED, FOR SOLUTION INTRAMUSCULAR; INTRAVENOUS at 11:50

## 2025-04-21 RX ADMIN — METHYLPREDNISOLONE SODIUM SUCCINATE 120 MG: 40 INJECTION INTRAMUSCULAR; INTRAVENOUS at 11:50

## 2025-04-21 RX ADMIN — SODIUM CHLORIDE 1000 ML: 9 INJECTION, SOLUTION INTRAVENOUS at 11:51

## 2025-04-21 RX ADMIN — FAMOTIDINE 40 MG: 10 INJECTION, SOLUTION INTRAVENOUS at 11:51

## 2025-04-21 RX ADMIN — DIPHENHYDRAMINE HYDROCHLORIDE 50 MG: 50 INJECTION, SOLUTION INTRAMUSCULAR; INTRAVENOUS at 11:50

## 2025-04-21 ASSESSMENT — PAIN SCALES - GENERAL: PAINLEVEL_OUTOF10: 5

## 2025-04-21 ASSESSMENT — ENCOUNTER SYMPTOMS
EYE PAIN: 0
COUGH: 0
TROUBLE SWALLOWING: 1
ABDOMINAL PAIN: 0
BACK PAIN: 0
SHORTNESS OF BREATH: 0
NAUSEA: 0
DIARRHEA: 0
RHINORRHEA: 0
VOMITING: 0
SORE THROAT: 0

## 2025-04-21 ASSESSMENT — PAIN DESCRIPTION - LOCATION: LOCATION: NECK;MOUTH

## 2025-04-21 ASSESSMENT — PAIN - FUNCTIONAL ASSESSMENT: PAIN_FUNCTIONAL_ASSESSMENT: 0-10

## 2025-04-21 NOTE — ED PROVIDER NOTES
sounds: Normal breath sounds.   Abdominal:      Palpations: Abdomen is soft. There is no pulsatile mass.      Tenderness: There is no abdominal tenderness.   Skin:     General: Skin is warm and dry.   Neurological:      Mental Status: She is alert.      GCS: GCS eye subscore is 4. GCS verbal subscore is 5. GCS motor subscore is 6.   Psychiatric:         Speech: Speech normal.           DIFFERENTIAL DIAGNOSIS/ MDM:     At this time the plan will be to treat as an allergic reaction.  I do not feel she requires airway protection at this time emergently and will monitor closely.    Differential diagnosis considered: Allergic reaction versus other known etiology.    Chronic Conditions affecting care (DM,HTN,CA, etc): See past medical history.    Social Determinants of Health affecting care (unable to care for self, lives alone, unemployed, homeless,etc): None    History source(s) (patient,spouse,parent,family,friend,EMS,etc): Patient    Review of external sources (ECF,Hospital records,EMS report, radiology reports, etc): Reviewed    Tests considered but not ordered: Not applicable    Independent interpretation of tests (eg.  X-ray, CAT scan, Doppler studies, EKG): ECG interpreted by myself if completed.    Discussion of x-ray results with radiology: See below    Consults: See below    Consideration for admission/observation (even if discharged): Considered    Prescription considerations: Not applicable    Sepsis considered: Considered but unlikely    Critical Care note written: See below    DIAGNOSTIC RESULTS     EKG: All EKG's are interpreted by the Emergency Department Physician who either signs or Co-signs this chart in the absence of a cardiologist.        RADIOLOGY:   Interpretation per the Radiologist below, if available at the time of this note:  No orders to display       No results found.    LABS:  No results found for this visit on 04/21/25.    EMERGENCY DEPARTMENT COURSE:     The patient was given the

## 2025-04-21 NOTE — DISCHARGE INSTRUCTIONS
Continue taking Benadryl and Pepcid every day.    PLEASE RETURN TO THE EMERGENCY DEPARTMENT IMMEDIATELY if your symptoms worsen in anyway or in 1-2 days if not improved for re-evaluation.  You should immediately return to the ER for symptoms such as new or worsening pain, difficulty breathing or swallowing, a change in your voice, a feeling of passing out, light headed, dizziness, chest pain, headache, neck pain, rash, abdominal pain or vomiting.    Take your medication as indicated and prescribed.  If you are given an antibiotic then, make sure you get the prescription filled and take the antibiotics until finished.      Please understand that at this time there is no evidence for a more serious underlying process, but that early in the process of an illness or injury, an emergency department workup can be falsely reassuring.  You should contact your family doctor within the next 48 hours for a follow up appointment.        THANK YOU!!!    From [facility] and [facility]    On behalf of the Emergency Department staff at [facility], I would like to thank you for giving us the opportunity to address your health care needs and concerns.    We hope that during your visit, our service was delivered in a professional and caring manner. Please keep [facility] in mind as we walk with you down the path to your own personal wellness.     Please expect an automated text message or email from us so we can ask a few questions about your health and progress. Based on your answers, a clinician may call you back to offer help and instructions.    Please understand that early in the process of an illness or injury, an emergency department workup can be falsely reassuring.  If you notice any worsening, changing or persistent symptoms please call your family doctor or return to the ER immediately.     Tell us how we did during your visit at [email or web address].[employer]   and let us know about your

## 2025-04-21 NOTE — TELEPHONE ENCOUNTER
Patient called in requesting a same day appointment.    She was experiencing throat swelling, and tongue swelling. Patient noted having a hard time swallowing.    Patient was advised to go to the emergency room as soon as possible, to which she agreed to do so.   [Malaise] : malaise [Intolerance to feeds] : intolerance to feeds [Vomiting] : vomiting [Abdominal Pain] : abdominal pain [Urine Volume Has Decreased] : urine volume has decreased [Negative] : Heme/Lymph [Fever] : no fever [Chills] : no chills [Nasal Discharge] : no nasal discharge [Nasal Congestion] : no nasal congestion [Sore Throat] : no sore throat [Appetite Changes] : no appetite changes [Diarrhea] : no diarrhea [Scrotal Swelling] : no scrotal swelling [Groin Pain] : no groin pain [FreeTextEntry2] : no headache

## 2025-05-08 LAB — NONINV COLON CA DNA+OCC BLD SCRN STL QL: NEGATIVE

## 2025-05-09 ENCOUNTER — RESULTS FOLLOW-UP (OUTPATIENT)
Dept: PRIMARY CARE CLINIC | Age: 75
End: 2025-05-09

## 2025-05-13 NOTE — TELEPHONE ENCOUNTER
Patient returned call and notified Cologuard was normal, repeat in 3 years. No question or concerns at this time.

## 2025-05-23 ENCOUNTER — TELEPHONE (OUTPATIENT)
Dept: PRIMARY CARE CLINIC | Age: 75
End: 2025-05-23

## 2025-06-30 RX ORDER — TRIAMCINOLONE ACETONIDE 5 MG/G
CREAM TOPICAL
Qty: 15 G | Refills: 0 | OUTPATIENT
Start: 2025-06-30